# Patient Record
Sex: FEMALE | Race: WHITE | Employment: OTHER | URBAN - METROPOLITAN AREA
[De-identification: names, ages, dates, MRNs, and addresses within clinical notes are randomized per-mention and may not be internally consistent; named-entity substitution may affect disease eponyms.]

---

## 2021-08-24 ENCOUNTER — TRANSCRIBE ORDER (OUTPATIENT)
Dept: SCHEDULING | Age: 71
End: 2021-08-24

## 2021-08-24 DIAGNOSIS — R10.31 RIGHT LOWER QUADRANT PAIN: Primary | ICD-10-CM

## 2021-08-24 DIAGNOSIS — Z87.19 HISTORY OF DIVERTICULITIS: ICD-10-CM

## 2021-08-24 DIAGNOSIS — Z87.42 HISTORY OF OVARIAN CYST: ICD-10-CM

## 2021-08-31 ENCOUNTER — HOSPITAL ENCOUNTER (OUTPATIENT)
Dept: CT IMAGING | Age: 71
Discharge: HOME | End: 2021-08-31
Attending: NURSE PRACTITIONER
Payer: MEDICARE

## 2021-08-31 DIAGNOSIS — Z87.42 HISTORY OF OVARIAN CYST: ICD-10-CM

## 2021-08-31 DIAGNOSIS — R10.31 RIGHT LOWER QUADRANT PAIN: ICD-10-CM

## 2021-08-31 DIAGNOSIS — Z87.19 HISTORY OF DIVERTICULITIS: ICD-10-CM

## 2021-08-31 LAB — CREAT BLD-MCNC: 0.91 MG/DL (ref 0.8–1.5)

## 2021-08-31 PROCEDURE — 82565 ASSAY OF CREATININE: CPT

## 2021-08-31 PROCEDURE — 74011000636 HC RX REV CODE- 636

## 2021-08-31 PROCEDURE — 74177 CT ABD & PELVIS W/CONTRAST: CPT

## 2021-08-31 PROCEDURE — 74011000258 HC RX REV CODE- 258

## 2021-08-31 RX ORDER — SODIUM CHLORIDE 0.9 % (FLUSH) 0.9 %
10 SYRINGE (ML) INJECTION
Status: COMPLETED | OUTPATIENT
Start: 2021-08-31 | End: 2021-08-31

## 2021-08-31 RX ADMIN — Medication 10 ML: at 11:34

## 2021-08-31 RX ADMIN — DIATRIZOATE MEGLUMINE AND DIATRIZOATE SODIUM 15 ML: 660; 100 LIQUID ORAL; RECTAL at 11:34

## 2021-08-31 RX ADMIN — SODIUM CHLORIDE 100 ML: 900 INJECTION, SOLUTION INTRAVENOUS at 11:34

## 2021-08-31 RX ADMIN — IOPAMIDOL 100 ML: 755 INJECTION, SOLUTION INTRAVENOUS at 11:33

## 2022-01-12 ENCOUNTER — APPOINTMENT (OUTPATIENT)
Dept: GENERAL RADIOLOGY | Age: 72
End: 2022-01-12
Attending: STUDENT IN AN ORGANIZED HEALTH CARE EDUCATION/TRAINING PROGRAM
Payer: MEDICARE

## 2022-01-12 ENCOUNTER — HOSPITAL ENCOUNTER (EMERGENCY)
Age: 72
Discharge: HOME | End: 2022-01-12
Attending: EMERGENCY MEDICINE
Payer: MEDICARE

## 2022-01-12 VITALS
RESPIRATION RATE: 21 BRPM | OXYGEN SATURATION: 95 % | DIASTOLIC BLOOD PRESSURE: 78 MMHG | HEART RATE: 66 BPM | TEMPERATURE: 97.9 F | SYSTOLIC BLOOD PRESSURE: 124 MMHG

## 2022-01-12 DIAGNOSIS — U07.1 COVID-19: Primary | ICD-10-CM

## 2022-01-12 LAB
ALBUMIN SERPL-MCNC: 3.6 G/DL (ref 3.2–4.6)
ALBUMIN/GLOB SERPL: 0.8 {RATIO} (ref 1.2–3.5)
ALP SERPL-CCNC: 109 U/L (ref 50–136)
ALT SERPL-CCNC: 36 U/L (ref 12–65)
ANION GAP SERPL CALC-SCNC: 10 MMOL/L (ref 7–16)
AST SERPL-CCNC: 27 U/L (ref 15–37)
ATRIAL RATE: 84 BPM
BASOPHILS # BLD: 0.2 K/UL (ref 0–0.2)
BASOPHILS NFR BLD: 3 % (ref 0–2)
BILIRUB SERPL-MCNC: 0.6 MG/DL (ref 0.2–1.1)
BUN SERPL-MCNC: 10 MG/DL (ref 8–23)
CALCIUM SERPL-MCNC: 9.3 MG/DL (ref 8.3–10.4)
CALCULATED P AXIS, ECG09: 58 DEGREES
CALCULATED R AXIS, ECG10: 82 DEGREES
CALCULATED T AXIS, ECG11: 30 DEGREES
CHLORIDE SERPL-SCNC: 107 MMOL/L (ref 98–107)
CO2 SERPL-SCNC: 22 MMOL/L (ref 21–32)
COVID-19 RAPID TEST, COVR: DETECTED
CREAT SERPL-MCNC: 1 MG/DL (ref 0.6–1)
CRP SERPL-MCNC: <0.3 MG/DL (ref 0–0.9)
D DIMER PPP FEU-MCNC: 0.98 UG/ML(FEU)
DIAGNOSIS, 93000: NORMAL
DIFFERENTIAL METHOD BLD: ABNORMAL
EOSINOPHIL # BLD: 0.1 K/UL (ref 0–0.8)
EOSINOPHIL NFR BLD: 1 % (ref 0.5–7.8)
ERYTHROCYTE [DISTWIDTH] IN BLOOD BY AUTOMATED COUNT: 12.3 % (ref 11.9–14.6)
GLOBULIN SER CALC-MCNC: 4.3 G/DL (ref 2.3–3.5)
GLUCOSE SERPL-MCNC: 90 MG/DL (ref 65–100)
HCT VFR BLD AUTO: 44.8 % (ref 35.8–46.3)
HGB BLD-MCNC: 14.9 G/DL (ref 11.7–15.4)
IMM GRANULOCYTES # BLD AUTO: 0 K/UL (ref 0–0.5)
IMM GRANULOCYTES NFR BLD AUTO: 0 % (ref 0–5)
LIPASE SERPL-CCNC: 137 U/L (ref 73–393)
LYMPHOCYTES # BLD: 3.7 K/UL (ref 0.5–4.6)
LYMPHOCYTES NFR BLD: 63 % (ref 13–44)
MAGNESIUM SERPL-MCNC: 1.7 MG/DL (ref 1.8–2.4)
MCH RBC QN AUTO: 28.7 PG (ref 26.1–32.9)
MCHC RBC AUTO-ENTMCNC: 33.3 G/DL (ref 31.4–35)
MCV RBC AUTO: 86.3 FL (ref 79.6–97.8)
MONOCYTES # BLD: 0.5 K/UL (ref 0.1–1.3)
MONOCYTES NFR BLD: 8 % (ref 4–12)
NEUTS SEG # BLD: 1.5 K/UL (ref 1.7–8.2)
NEUTS SEG NFR BLD: 25 % (ref 43–78)
NRBC # BLD: 0 K/UL (ref 0–0.2)
P-R INTERVAL, ECG05: 176 MS
PLATELET # BLD AUTO: 263 K/UL (ref 150–450)
PMV BLD AUTO: 10 FL (ref 9.4–12.3)
POTASSIUM SERPL-SCNC: 3.6 MMOL/L (ref 3.5–5.1)
PROT SERPL-MCNC: 7.9 G/DL (ref 6.3–8.2)
Q-T INTERVAL, ECG07: 392 MS
QRS DURATION, ECG06: 72 MS
QTC CALCULATION (BEZET), ECG08: 463 MS
RBC # BLD AUTO: 5.19 M/UL (ref 4.05–5.2)
SODIUM SERPL-SCNC: 139 MMOL/L (ref 136–145)
SOURCE, COVRS: ABNORMAL
TROPONIN-HIGH SENSITIVITY: 11.8 PG/ML (ref 0–14)
TROPONIN-HIGH SENSITIVITY: 8.1 PG/ML (ref 0–14)
VENTRICULAR RATE, ECG03: 84 BPM
WBC # BLD AUTO: 5.9 K/UL (ref 4.3–11.1)

## 2022-01-12 PROCEDURE — 86140 C-REACTIVE PROTEIN: CPT

## 2022-01-12 PROCEDURE — 83735 ASSAY OF MAGNESIUM: CPT

## 2022-01-12 PROCEDURE — 85379 FIBRIN DEGRADATION QUANT: CPT

## 2022-01-12 PROCEDURE — 93005 ELECTROCARDIOGRAM TRACING: CPT | Performed by: STUDENT IN AN ORGANIZED HEALTH CARE EDUCATION/TRAINING PROGRAM

## 2022-01-12 PROCEDURE — 80053 COMPREHEN METABOLIC PANEL: CPT

## 2022-01-12 PROCEDURE — 85025 COMPLETE CBC W/AUTO DIFF WBC: CPT

## 2022-01-12 PROCEDURE — 94762 N-INVAS EAR/PLS OXIMTRY CONT: CPT

## 2022-01-12 PROCEDURE — 71046 X-RAY EXAM CHEST 2 VIEWS: CPT

## 2022-01-12 PROCEDURE — 87635 SARS-COV-2 COVID-19 AMP PRB: CPT

## 2022-01-12 PROCEDURE — 83690 ASSAY OF LIPASE: CPT

## 2022-01-12 PROCEDURE — 93005 ELECTROCARDIOGRAM TRACING: CPT | Performed by: EMERGENCY MEDICINE

## 2022-01-12 PROCEDURE — 99284 EMERGENCY DEPT VISIT MOD MDM: CPT

## 2022-01-12 PROCEDURE — 84484 ASSAY OF TROPONIN QUANT: CPT

## 2022-01-12 RX ORDER — SODIUM CHLORIDE 0.9 % (FLUSH) 0.9 %
5-10 SYRINGE (ML) INJECTION AS NEEDED
Status: DISCONTINUED | OUTPATIENT
Start: 2022-01-12 | End: 2022-01-12 | Stop reason: HOSPADM

## 2022-01-12 RX ORDER — SODIUM CHLORIDE 0.9 % (FLUSH) 0.9 %
5-10 SYRINGE (ML) INJECTION EVERY 8 HOURS
Status: DISCONTINUED | OUTPATIENT
Start: 2022-01-12 | End: 2022-01-12 | Stop reason: HOSPADM

## 2022-01-12 NOTE — ED TRIAGE NOTES
Patient arrives via [place] from home. Masked. Reports shortness of breath, dry cough, worsening over past week. 100 RA; 156/70; HR 75;  Reports chest pain this am.  Denies leg swelling. Denies fever, chills, body aches. No interventions with EMS.

## 2022-01-12 NOTE — DISCHARGE INSTRUCTIONS
USE THE FOLLOWING TREATMENTS AND SUPPLEMENTS TO HELP SPEED UP YOUR RECOVERY:  (ALL AVAILABLE WITHOUT PRESCRIPTION)    VITAMIN D3 5000UNITS DAILY  VITAMIN C  500-1000MG TWICE DAILY  QUERCETIN 250MG DAILY  ZINC  100MG DAILY (elemental Zinc)  ASPIRIN 325MG DAILY (UNLESS ALLERGIC OR PREVIOUS ADVERSE REACTION)  MELATONIN 10MG AT BEDTIME      ANTI-SEPTIC/ANTI-VIRALS    ANTIVIRAL MOUTHWASH: GARGLE 3 TIMES DAILY (DO NOT SWALLOW; MUST CONTAIN CHLORHEXIDINE, POVIDONE-IODINE, OR CETYLPURIDINIUM CHLORIDE)  IODINE NASAL SPRAY/DROPS:  USE 1% POVIDONE-IODINE SPRAY AS INSTRUCTED 2-3 TIMES DAILY    MONITOR BLOOD OXYGEN SATURATION WITH PULSE OXIMETER  (AVAILABLE AT MOST DRUG STORES)    VISIT: [email or web address]  for more information and telemedicine links to providers who may be able to prescribe additional antiviral medications.

## 2022-01-12 NOTE — ED PROVIDER NOTES
Patient presents emergency room with complaints of generalized weakness and fatigue that have been present for about a week now. Symptoms have been gradually worsening and are associate with a dry cough and change in taste. She has had some chest discomfort this morning as well. She denies any fever or chills or body aches and review of systems is otherwise negative. Patient reports history of hypertension. The history is provided by the patient and medical records. Fatigue  This is a new problem. Episode onset: About a week ago. The problem has been gradually worsening. There was no focality noted. Pertinent negatives include no focal weakness, no loss of sensation, no loss of balance, no slurred speech, no speech difficulty, no memory loss, no movement disorder, no agitation, no visual change, no auditory change, no mental status change, no unresponsiveness and no disorientation. There has been no fever. Associated symptoms include shortness of breath and chest pain. Pertinent negatives include no vomiting, no altered mental status, no confusion, no headaches, no choking, no nausea, no bowel incontinence and no bladder incontinence. There were no medications administered prior to arrival.        No past medical history on file. No past surgical history on file. No family history on file.     Social History     Socioeconomic History    Marital status:      Spouse name: Not on file    Number of children: Not on file    Years of education: Not on file    Highest education level: Not on file   Occupational History    Not on file   Tobacco Use    Smoking status: Not on file    Smokeless tobacco: Not on file   Substance and Sexual Activity    Alcohol use: Not on file    Drug use: Not on file    Sexual activity: Not on file   Other Topics Concern    Not on file   Social History Narrative    Not on file     Social Determinants of Health     Financial Resource Strain:     Difficulty of Paying Living Expenses: Not on file   Food Insecurity:     Worried About Running Out of Food in the Last Year: Not on file    Ran Out of Food in the Last Year: Not on file   Transportation Needs:     Lack of Transportation (Medical): Not on file    Lack of Transportation (Non-Medical): Not on file   Physical Activity:     Days of Exercise per Week: Not on file    Minutes of Exercise per Session: Not on file   Stress:     Feeling of Stress : Not on file   Social Connections:     Frequency of Communication with Friends and Family: Not on file    Frequency of Social Gatherings with Friends and Family: Not on file    Attends Adventist Services: Not on file    Active Member of [address] [place] [address] [employer] or Organizations: Not on file    Attends Club or Organization Meetings: Not on file    Marital Status: Not on file   Intimate Partner Violence:     Fear of Current or Ex-Partner: Not on file    Emotionally Abused: Not on file    Physically Abused: Not on file    Sexually Abused: Not on file   Housing Stability:     Unable to Pay for Housing in the Last Year: Not on file    Number of [place] in the Last Year: Not on file    Unstable Housing in the Last Year: Not on file         ALLERGIES: Patient has no known allergies. Review of Systems   Constitutional: Positive for fatigue. Negative for appetite change, chills and fever. Respiratory: Positive for cough and shortness of breath. Negative for choking. Cardiovascular: Positive for chest pain. Negative for palpitations and leg swelling. Gastrointestinal: Negative for bowel incontinence, nausea and vomiting. Genitourinary: Negative for bladder incontinence. Neurological: Negative for focal weakness, speech difficulty, headaches and loss of balance. Psychiatric/Behavioral: Negative for agitation, confusion and memory loss. All other systems reviewed and are negative.       Vitals:    01/12/22 1027   BP: 139/74   Pulse: 79   Resp: 20   Temp: 97.9 °F (36.6 °C)   SpO2: 100%            Physical Exam  Vitals and nursing note reviewed. Constitutional:       General: She is not in acute distress. Appearance: Normal appearance. She is not ill-appearing, toxic-appearing or diaphoretic. HENT:      Head: Normocephalic and atraumatic. Nose: Nose normal.      Mouth/Throat:      Mouth: Mucous membranes are moist.      Pharynx: Oropharynx is clear. Eyes:      Extraocular Movements: Extraocular movements intact. Conjunctiva/sclera: Conjunctivae normal.      Pupils: Pupils are equal, round, and reactive to light. Cardiovascular:      Rate and Rhythm: Normal rate and regular rhythm. Pulses: Normal pulses. Heart sounds: Normal heart sounds. Pulmonary:      Effort: Pulmonary effort is normal.      Breath sounds: Normal breath sounds. Abdominal:      General: There is no distension. Palpations: Abdomen is soft. Tenderness: There is no abdominal tenderness. There is no right CVA tenderness, left CVA tenderness, guarding or rebound. Musculoskeletal:         General: Normal range of motion. Cervical back: Normal range of motion and neck supple. Skin:     General: Skin is warm and dry. Capillary Refill: Capillary refill takes less than 2 seconds. Neurological:      General: No focal deficit present. Mental Status: She is alert and oriented to person, place, and time. Mental status is at baseline. Psychiatric:         Mood and Affect: Mood normal.         Behavior: Behavior normal.         Thought Content: Thought content normal.          MDM  Number of Diagnoses or Management Options  COVID-19  Diagnosis management comments: Given duration of symptoms that have been constant with a normal EKG and negative troponin cardiac etiology of the discomfort is ruled out. Patient does have a positive COVID test and her symptoms are consistent with that.   Patient does have risk factors that would qualify her for monoclonal antibody therapy however it is not available at this time at this facility. She was given informed consent in regards to the newly released for emergency use oral antiviral medications.        Amount and/or Complexity of Data Reviewed  Clinical lab tests: ordered and reviewed  Tests in the radiology section of CPT®: ordered and reviewed  Review and summarize past medical records: yes  Independent visualization of images, tracings, or specimens: yes (EKG at 10:32 AM: Is a normal sinus rhythm, rate of 84, no acute ischemic changes and no ectopy.)    Risk of Complications, Morbidity, and/or Mortality  Presenting problems: moderate  Diagnostic procedures: moderate  Management options: moderate    Patient Progress  Patient progress: stable         Procedures

## 2022-01-12 NOTE — ED NOTES
I have reviewed discharge instructions with the patient. The patient verbalized understanding. Patient left ED via Discharge Method: ambulatory to Home with . Opportunity for questions and clarification provided. Patient given 0 scripts. To continue your aftercare when you leave the hospital, you may receive an automated call from our care team to check in on how you are doing. This is a free service and part of our promise to provide the best care and service to meet your aftercare needs.  If you have questions, or wish to unsubscribe from this service please call [phone]. Thank you for Choosing our [facility] Emergency Department.

## 2022-01-13 ENCOUNTER — HOSPITAL ENCOUNTER (EMERGENCY)
Age: 72
Discharge: HOME | End: 2022-01-13
Attending: EMERGENCY MEDICINE
Payer: MEDICARE

## 2022-01-13 VITALS
HEART RATE: 97 BPM | HEIGHT: 66 IN | BODY MASS INDEX: 27.32 KG/M2 | DIASTOLIC BLOOD PRESSURE: 85 MMHG | OXYGEN SATURATION: 96 % | TEMPERATURE: 98.4 F | SYSTOLIC BLOOD PRESSURE: 138 MMHG | WEIGHT: 170 LBS | RESPIRATION RATE: 20 BRPM

## 2022-01-13 DIAGNOSIS — U07.1 COVID-19: Primary | ICD-10-CM

## 2022-01-13 PROCEDURE — 99282 EMERGENCY DEPT VISIT SF MDM: CPT

## 2022-01-13 PROCEDURE — 74011000258 HC RX REV CODE- 258: Performed by: EMERGENCY MEDICINE

## 2022-01-13 PROCEDURE — 74011000636 HC RX REV CODE- 636: Performed by: EMERGENCY MEDICINE

## 2022-01-13 PROCEDURE — M0243 CASIRIVI AND IMDEVI INFUSION: HCPCS

## 2022-01-13 RX ADMIN — CASIRIVIMAB AND IMDEVIMAB 1200 MG: 600; 600 INJECTION, SOLUTION, CONCENTRATE INTRAVENOUS at 14:55

## 2022-01-13 NOTE — ED PROVIDER NOTES
CERTIFICATE OF WORK        September 28, 2021      Re:   Cornelia Ibrahim  [address] [facility] [address]                        This is to certify that Cornelia Ibrahim was seen in the urgent care on 9/28/2021 and can return to regular work on 9/30/202; pending negative COVID and symptom free for 24 hours or more.          SIGNATURE:___________________________________________,   9/28/2021                                      SPENCER Meng        [facility]  [address] [place] [address] [phone]   Patient was seen yesterday and diagnosed with COVID. Feeling worse today with worsening shortness of breath and body aches. She was prescribed the oral Paxlovid yesterday and took her first dose last night. Returns today requesting the IV antibodies. The history is provided by the patient. No  was used. Positive For Covid-19  Severity:  Moderate  Timing:  Constant  Progression:  Worsening  Chronicity:  New  Relieved by:  Nothing  Worsened by:  Nothing  Ineffective treatments:  None tried  Associated symptoms: myalgias    Associated symptoms: no chest pain, no chills, no diarrhea, no headaches, no nausea, no rash and no vomiting         No past medical history on file. No past surgical history on file. No family history on file. Social History     Socioeconomic History    Marital status:      Spouse name: Not on file    Number of children: Not on file    Years of education: Not on file    Highest education level: Not on file   Occupational History    Not on file   Tobacco Use    Smoking status: Not on file    Smokeless tobacco: Not on file   Substance and Sexual Activity    Alcohol use: Not on file    Drug use: Not on file    Sexual activity: Not on file   Other Topics Concern    Not on file   Social History Narrative    Not on file     Social Determinants of Health     Financial Resource Strain:     Difficulty of Paying Living Expenses: Not on file   Food Insecurity:     Worried About Running Out of Food in the Last Year: Not on file    Matt of Food in the Last Year: Not on file   Transportation Needs:     Lack of Transportation (Medical): Not on file    Lack of Transportation (Non-Medical):  Not on file   Physical Activity:     Days of Exercise per Week: Not on file    Minutes of Exercise per Session: Not on file   Stress:     Feeling of Stress : Not on file   Social Connections:     Frequency of Communication with Friends and Family: Not on file    Frequency of Social Gatherings with Friends and Family: Not on file    Attends Zoroastrian Services: Not on file    Active Member of Clubs or Organizations: Not on file    Attends Club or Organization Meetings: Not on file    Marital Status: Not on file   Intimate Partner Violence:     Fear of Current or Ex-Partner: Not on file    Emotionally Abused: Not on file    Physically Abused: Not on file    Sexually Abused: Not on file   Housing Stability:     Unable to Pay for Housing in the Last Year: Not on file    Number of [place] in the Last Year: Not on file    Unstable Housing in the Last Year: Not on file         ALLERGIES: Patient has no known allergies. Review of Systems   Constitutional: Positive for fatigue. Negative for chills and fever. Eyes: Negative for pain and redness. Respiratory: Positive for shortness of breath. Negative for chest tightness and wheezing. Cardiovascular: Negative for chest pain and leg swelling. Gastrointestinal: Negative for abdominal pain, diarrhea, nausea and vomiting. Musculoskeletal: Positive for myalgias. Negative for back pain, gait problem, neck pain and neck stiffness. Skin: Negative for color change and rash. Neurological: Negative for weakness, numbness and headaches. Vitals:    01/13/22 1250   BP: 138/85   Pulse: 97   Resp: 20   Temp: 98.4 °F (36.9 °C)   SpO2: 99%   Weight: 77.1 kg (170 lb)   Height: 5' 5.75\" (1.67 m)            Physical Exam  Constitutional:       Appearance: Normal appearance. She is well-developed. HENT:      Head: Normocephalic and atraumatic. Cardiovascular:      Rate and Rhythm: Normal rate and regular rhythm. Pulmonary:      Effort: Respiratory distress present. Breath sounds: Normal breath sounds. No wheezing. Abdominal:      General: Bowel sounds are normal.      Palpations: Abdomen is soft. Tenderness: There is no abdominal tenderness. Musculoskeletal:         General: Normal range of motion. Cervical back: Normal range of motion and neck supple. Skin:     General: Skin is warm and dry. Neurological:      Mental Status: She is alert and oriented to person, place, and time. MDM  Number of Diagnoses or Management Options  Diagnosis management comments: Had a long discussion about the hospital only having Regeneron at this time. May not be effective for omicron. Also that she does not need to take both Regeneron and Paxlovid. She is requesting the Regeneron and will stop the Paxil COVID. We will then isolate at home after receiving. Will order and discharge.        Amount and/or Complexity of Data Reviewed  Tests in the medicine section of CPT®: ordered and reviewed    Patient Progress  Patient progress: stable         Procedures

## 2022-01-13 NOTE — ED TRIAGE NOTES
Pt ambulatory to triage with report of positive COVID yesterday. Reports she feels worse today.  VSS in triage requesting antibodies

## 2022-01-13 NOTE — ED NOTES
I have reviewed discharge instructions with the patient. The patient verbalized understanding. Patient left ED via Discharge Method: ambulatory to [facility]. Opportunity for questions and clarification provided. Patient given 0 scripts. To continue your aftercare when you leave the hospital, you may receive an automated call from our care team to check in on how you are doing. This is a free service and part of our promise to provide the best care and service to meet your aftercare needs.  If you have questions, or wish to unsubscribe from this service please call [phone]. Thank you for Choosing our [facility] Emergency Department.

## 2022-01-13 NOTE — Clinical Note
[address] [facility] EMERGENCY DEPT   [facility] [address] [place] [id number] [phone]    Work/School Note    Date: 1/13/2022     To Whom It May concern:    Everett Pradhan was evaluated by the following provider(s):  Attending Provider: Madeline Batista [address] virus is suspected. Per the CDC guidelines we recommend home isolation until the following conditions are all met:    1. At least five days have passed since symptoms first appeared and/or had a close exposure,   2. After home isolation for five days, wearing a mask around others for the next five days,  3. At least 24 have passed since last fever without the use of fever-reducing medications and  4.  Symptoms (eg cough, shortness of breath) have improved      Sincerely,          Michelle Hassan RN

## 2022-01-13 NOTE — Clinical Note
[address] [facility] EMERGENCY DEPT   [facility]  [place] [facility] [id number] [phone]    Work/School Note    Date: 1/13/2022     To Whom It May concern:    Cortez Jaramillo was evaluated by the following provider(s):  Attending Provider: Soraya Jorge [address] [place] [address] virus is suspected. Per the CDC guidelines we recommend home isolation until the following conditions are all met:    1. At least five days have passed since symptoms first appeared and/or had a close exposure,   2. After home isolation for five days, wearing a mask around others for the next five days,  3. At least 24 have passed since last fever without the use of fever-reducing medications and  4.  Symptoms (eg cough, shortness of breath) have improved      Sincerely,          [place] Carlos Gamboa MD

## 2022-01-31 PROBLEM — I10 HYPERTENSION: Status: ACTIVE | Noted: 2019-10-05

## 2022-01-31 PROBLEM — K21.9 GERD (GASTROESOPHAGEAL REFLUX DISEASE): Status: ACTIVE | Noted: 2019-10-05

## 2022-01-31 PROBLEM — E87.6 HYPOKALEMIA: Status: ACTIVE | Noted: 2019-10-05

## 2022-01-31 PROBLEM — R00.2 PALPITATION: Status: ACTIVE | Noted: 2019-10-08

## 2022-01-31 PROBLEM — M51.36 DEGENERATION OF INTERVERTEBRAL DISC OF LUMBAR REGION: Status: ACTIVE | Noted: 2018-09-27

## 2022-01-31 PROBLEM — M54.16 LUMBAR RADICULOPATHY: Status: ACTIVE | Noted: 2018-09-27

## 2022-01-31 PROBLEM — E78.00 HYPERCHOLESTEREMIA: Status: ACTIVE | Noted: 2019-10-05

## 2022-03-11 ENCOUNTER — HOSPITAL ENCOUNTER (OUTPATIENT)
Dept: MAMMOGRAPHY | Age: 72
Discharge: HOME | End: 2022-03-11

## 2022-03-11 DIAGNOSIS — Z12.31 BREAST CANCER SCREENING BY MAMMOGRAM: ICD-10-CM

## 2022-03-18 PROBLEM — E66.3 OVERWEIGHT (BMI 25.0-29.9): Status: ACTIVE | Noted: 2022-03-18

## 2022-03-18 PROBLEM — I10 HYPERTENSION: Status: ACTIVE | Noted: 2019-10-05

## 2022-03-18 PROBLEM — R00.2 PALPITATION: Status: ACTIVE | Noted: 2019-10-08

## 2022-03-18 PROBLEM — R07.89 ATYPICAL CHEST PAIN: Status: ACTIVE | Noted: 2022-03-18

## 2022-03-18 PROBLEM — E78.5 HYPERLIPIDEMIA: Status: ACTIVE | Noted: 2019-10-05

## 2022-03-19 PROBLEM — M51.36 DEGENERATION OF INTERVERTEBRAL DISC OF LUMBAR REGION: Status: ACTIVE | Noted: 2018-09-27

## 2022-03-19 PROBLEM — E87.6 HYPOKALEMIA: Status: ACTIVE | Noted: 2019-10-05

## 2022-03-19 PROBLEM — E78.5 HYPERLIPIDEMIA: Status: ACTIVE | Noted: 2019-10-05

## 2022-03-19 PROBLEM — M54.16 LUMBAR RADICULOPATHY: Status: ACTIVE | Noted: 2018-09-27

## 2022-03-19 PROBLEM — M51.369 DEGENERATION OF INTERVERTEBRAL DISC OF LUMBAR REGION: Status: ACTIVE | Noted: 2018-09-27

## 2022-03-20 PROBLEM — K21.9 GERD (GASTROESOPHAGEAL REFLUX DISEASE): Status: ACTIVE | Noted: 2019-10-05

## 2022-03-22 ENCOUNTER — HOSPITAL ENCOUNTER (OUTPATIENT)
Dept: CT IMAGING | Age: 72
Discharge: HOME | End: 2022-03-22
Attending: INTERNAL MEDICINE

## 2022-03-22 DIAGNOSIS — R07.89 ATYPICAL CHEST PAIN: ICD-10-CM

## 2022-03-22 RX ORDER — SODIUM CHLORIDE 0.9 % (FLUSH) 0.9 %
10 SYRINGE (ML) INJECTION
Status: COMPLETED | OUTPATIENT
Start: 2022-03-22 | End: 2022-03-22

## 2022-03-22 RX ADMIN — Medication 10 ML: at 15:14

## 2022-03-22 NOTE — PROGRESS NOTES
Please let the patient know that her CTA was negative for PE. I placed a pulmonary medicine consult for an incidental finding of pulmonary nodules. I placed an ultrasound of the thyroid in the setting of an incidental finding of thyroid nodules.

## 2022-03-24 PROBLEM — E66.3 OVERWEIGHT (BMI 25.0-29.9): Status: ACTIVE | Noted: 2022-03-18

## 2022-03-24 PROBLEM — R00.2 PALPITATIONS: Status: ACTIVE | Noted: 2019-10-08

## 2022-03-24 PROBLEM — R07.89 ATYPICAL CHEST PAIN: Status: ACTIVE | Noted: 2022-03-18

## 2022-04-05 ENCOUNTER — HOSPITAL ENCOUNTER (OUTPATIENT)
Dept: ULTRASOUND IMAGING | Age: 72
Discharge: HOME | End: 2022-04-05
Attending: INTERNAL MEDICINE
Payer: MEDICARE

## 2022-04-05 DIAGNOSIS — E04.1 THYROID NODULE INCIDENTALLY NOTED ON IMAGING STUDY: ICD-10-CM

## 2022-04-05 PROCEDURE — 76536 US EXAM OF HEAD AND NECK: CPT

## 2022-04-05 NOTE — PROGRESS NOTES
Please let the patient know that she has thyroid nodules that the radiologist recommends FNA. I placed a endocrinology consult.

## 2022-04-29 PROBLEM — R05.9 COUGH: Status: ACTIVE | Noted: 2022-04-29

## 2022-04-29 PROBLEM — R91.8 PULMONARY NODULES: Status: ACTIVE | Noted: 2022-04-29

## 2022-05-05 PROBLEM — E04.2 MULTINODULAR GOITER: Status: ACTIVE | Noted: 2022-05-05

## 2022-05-05 PROBLEM — R59.0 CERVICAL LYMPHADENOPATHY: Status: ACTIVE | Noted: 2022-05-05

## 2022-05-24 ENCOUNTER — PATIENT MESSAGE (OUTPATIENT)
Dept: ENDOCRINOLOGY | Age: 72
End: 2022-05-24

## 2022-05-24 DIAGNOSIS — R59.0 CERVICAL LYMPHADENOPATHY: ICD-10-CM

## 2022-05-24 DIAGNOSIS — E04.2 MULTINODULAR GOITER: Primary | ICD-10-CM

## 2022-05-25 NOTE — TELEPHONE ENCOUNTER
From: Dr. Garnett [place]  To: Nelson Sorenson  Sent: 5/24/2022 2:27 PM EDT  Subject: Final thyroid biopsy results    I received the final biopsy results. As you may recall, the nodule in the left lobe was indeterminate (cytology revealed atypia of undetermined significance). The molecular testing came back and was read as \"suspicious. \" This confers an approximately 50% risk of thyroid cancer. For this reason, I would recommend that you have a total thyroidectomy. Please let me know if you have any questions. If this is acceptable, let me know and I will refer you to the surgeon. Thanks.     Dr. Reema Hoang

## 2022-05-29 PROBLEM — R05.9 COUGH: Status: RESOLVED | Noted: 2022-04-29 | Resolved: 2022-05-29

## 2022-06-20 ENCOUNTER — TELEPHONE (OUTPATIENT)
Dept: FAMILY MEDICINE CLINIC | Facility: CLINIC | Age: 72
End: 2022-06-20

## 2022-06-20 DIAGNOSIS — E89.0 POSTOPERATIVE HYPOTHYROIDISM: Primary | ICD-10-CM

## 2022-06-20 RX ORDER — LEVOTHYROXINE SODIUM 125 UG/1
125 TABLET ORAL DAILY
Qty: 90 TABLET | Refills: 3 | Status: SHIPPED | OUTPATIENT
Start: 2022-06-20 | End: 2022-07-07 | Stop reason: DRUGHIGH

## 2022-06-20 RX ORDER — LEVOTHYROXINE SODIUM 125 UG/1
TABLET ORAL
COMMUNITY
Start: 2022-06-11 | End: 2022-06-20 | Stop reason: SDUPTHER

## 2022-07-06 ENCOUNTER — PATIENT MESSAGE (OUTPATIENT)
Dept: ENDOCRINOLOGY | Age: 72
End: 2022-07-06

## 2022-07-07 RX ORDER — LEVOTHYROXINE SODIUM 100 UG/1
100 TABLET ORAL DAILY
Qty: 30 TABLET | Refills: 5 | Status: SHIPPED | OUTPATIENT
Start: 2022-07-07 | End: 2022-08-19 | Stop reason: SDUPTHER

## 2022-07-07 NOTE — TELEPHONE ENCOUNTER
From: Martin Short  To: Dr. Magda Newell: 7/6/2022 3:56 PM EDT  Subject: TSN, T4    [facility] Doctor,  On July 2, 2022, I was at [facility] and today I received the results of a blood test:   TSH = 0.015 and   T4, free(direct) = 2.67. What do you recommend me? Thank you,  Martin Short.

## 2022-08-19 ENCOUNTER — OFFICE VISIT (OUTPATIENT)
Dept: ENDOCRINOLOGY | Age: 72
End: 2022-08-19
Payer: MEDICARE

## 2022-08-19 VITALS
OXYGEN SATURATION: 96 % | SYSTOLIC BLOOD PRESSURE: 118 MMHG | WEIGHT: 175 LBS | DIASTOLIC BLOOD PRESSURE: 70 MMHG | HEART RATE: 84 BPM | HEIGHT: 66 IN | BODY MASS INDEX: 28.12 KG/M2

## 2022-08-19 DIAGNOSIS — E89.0 POSTSURGICAL HYPOTHYROIDISM: ICD-10-CM

## 2022-08-19 DIAGNOSIS — C73 THYROID CANCER (HCC): ICD-10-CM

## 2022-08-19 PROBLEM — E04.2 MULTINODULAR GOITER: Status: RESOLVED | Noted: 2022-05-05 | Resolved: 2022-08-19

## 2022-08-19 PROCEDURE — 1090F PRES/ABSN URINE INCON ASSESS: CPT | Performed by: INTERNAL MEDICINE

## 2022-08-19 PROCEDURE — G8400 PT W/DXA NO RESULTS DOC: HCPCS | Performed by: INTERNAL MEDICINE

## 2022-08-19 PROCEDURE — 1036F TOBACCO NON-USER: CPT | Performed by: INTERNAL MEDICINE

## 2022-08-19 PROCEDURE — 99214 OFFICE O/P EST MOD 30 MIN: CPT | Performed by: INTERNAL MEDICINE

## 2022-08-19 PROCEDURE — G8419 CALC BMI OUT NRM PARAM NOF/U: HCPCS | Performed by: INTERNAL MEDICINE

## 2022-08-19 PROCEDURE — G8427 DOCREV CUR MEDS BY ELIG CLIN: HCPCS | Performed by: INTERNAL MEDICINE

## 2022-08-19 PROCEDURE — 1123F ACP DISCUSS/DSCN MKR DOCD: CPT | Performed by: INTERNAL MEDICINE

## 2022-08-19 PROCEDURE — 3017F COLORECTAL CA SCREEN DOC REV: CPT | Performed by: INTERNAL MEDICINE

## 2022-08-19 RX ORDER — LEVOTHYROXINE SODIUM 100 UG/1
100 TABLET ORAL DAILY
Qty: 30 TABLET | Refills: 5
Start: 2022-08-19 | End: 2022-08-22 | Stop reason: DRUGHIGH

## 2022-08-19 ASSESSMENT — ENCOUNTER SYMPTOMS
ROS SKIN COMMENTS: DENIES HAIR LOSS, DRY SKIN.
DIARRHEA: 0
CONSTIPATION: 0

## 2022-08-19 NOTE — PROGRESS NOTES
Rod Jacinto MD, [facility]  [address], [address] [facility], [address] [place] [address]  Phone [phone]  Facsimile [phone]          Adriana Harris is a 71 y.o. female seen 8/19/2022 for follow-up of thyroid cancer and. Hypothyroidism        ASSESSMENT AND PLAN:    1. 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 There were no high risk features on final pathology and surgery should be adequate therapy. I have therefore recommended against radioactive iodine remnant ablation. We are likely to detect any clinically significant disease persistence or recurrence through routine monitoring of thyroglobulin levels and neck ultrasounds. I will check a baseline postoperative thyroglobulin level today. 2. Postsurgical hypothyroidism  Goal TSH 0.4-2.0 for now. I will check her thyroid function tests and let her know if her dose needs to be adjusted. - EUTHYROX 100 MCG tablet; Take 1 tablet by mouth Daily  Dispense: 30 tablet; Refill: 5      Follow-up and Dispositions    Return in about 3 months (around 11/19/2022), or Friday afternoon is okay. HISTORY OF PRESENT ILLNESS:    THYROID CANCER    Presentation: Multinodular goiter with Afirma suspicious left lobe nodule status post total thyroidectomy and central lymphadenectomy 6/10/2022 (pathology revealed 1. 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 No current facility-administered medications for this visit.

## 2022-08-20 LAB
T4 FREE SERPL-MCNC: 1.4 NG/DL (ref 0.78–1.46)
TSH, 3RD GENERATION: 0.02 UIU/ML (ref 0.36–3.74)

## 2022-08-22 ENCOUNTER — TELEPHONE (OUTPATIENT)
Dept: ENDOCRINOLOGY | Age: 72
End: 2022-08-22

## 2022-08-22 DIAGNOSIS — E89.0 POSTSURGICAL HYPOTHYROIDISM: Primary | ICD-10-CM

## 2022-08-22 LAB — THYROGLOB AB SERPL-ACNC: 630.4 IU/ML (ref 0–0.9)

## 2022-08-22 RX ORDER — LEVOTHYROXINE SODIUM 75 UG/1
75 TABLET ORAL DAILY
Qty: 30 TABLET | Refills: 5 | Status: SHIPPED | OUTPATIENT
Start: 2022-08-22

## 2022-08-22 NOTE — TELEPHONE ENCOUNTER
Patient informed of new dose of Euthyrox and that it was sent to her pharmacy. Patient expressed understanding.

## 2022-08-28 LAB — THYROGLOBULIN: 7.9 NG/ML

## 2022-08-29 ENCOUNTER — PATIENT MESSAGE (OUTPATIENT)
Dept: ENDOCRINOLOGY | Age: 72
End: 2022-08-29

## 2022-08-29 DIAGNOSIS — C73 THYROID CANCER (HCC): Primary | ICD-10-CM

## 2022-09-15 ENCOUNTER — HOSPITAL ENCOUNTER (OUTPATIENT)
Dept: CT IMAGING | Age: 72
Discharge: HOME | End: 2022-09-18
Payer: MEDICARE

## 2022-09-15 ENCOUNTER — HOSPITAL ENCOUNTER (OUTPATIENT)
Dept: ULTRASOUND IMAGING | Age: 72
Discharge: HOME | End: 2022-09-18
Payer: MEDICARE

## 2022-09-15 VITALS — BODY MASS INDEX: 29.88 KG/M2 | WEIGHT: 175 LBS | HEIGHT: 64 IN

## 2022-09-15 DIAGNOSIS — C73 THYROID CANCER (HCC): ICD-10-CM

## 2022-09-15 PROCEDURE — 71250 CT THORAX DX C-: CPT

## 2022-09-15 PROCEDURE — 76536 US EXAM OF HEAD AND NECK: CPT

## 2022-09-20 ENCOUNTER — TELEPHONE (OUTPATIENT)
Dept: ENDOCRINOLOGY | Age: 72
End: 2022-09-20

## 2022-09-20 DIAGNOSIS — C73 THYROID CANCER (HCC): Primary | ICD-10-CM

## 2022-09-20 NOTE — TELEPHONE ENCOUNTER
----- Message from Bessie Acosta sent at 9/20/2022  9:24 AM EDT -----  Patient called stating she does want to get the biopsy done. Did you want it in a follow up or biopsy spot?

## 2022-09-21 NOTE — TELEPHONE ENCOUNTER
Called [place] speaking patient to notify her of the referral put in for her biopsy. Pt expressed understanding.

## 2022-09-29 ENCOUNTER — HOSPITAL ENCOUNTER (OUTPATIENT)
Dept: ULTRASOUND IMAGING | Age: 72
Discharge: HOME | End: 2022-10-02
Payer: MEDICARE

## 2022-09-29 VITALS
HEART RATE: 91 BPM | OXYGEN SATURATION: 93 % | TEMPERATURE: 97.7 F | DIASTOLIC BLOOD PRESSURE: 73 MMHG | SYSTOLIC BLOOD PRESSURE: 137 MMHG | RESPIRATION RATE: 16 BRPM

## 2022-09-29 DIAGNOSIS — C73 THYROID CANCER (HCC): ICD-10-CM

## 2022-09-29 PROCEDURE — 88173 CYTOPATH EVAL FNA REPORT: CPT

## 2022-09-29 PROCEDURE — 2500000003 HC RX 250 WO HCPCS: Performed by: PHYSICIAN ASSISTANT

## 2022-09-29 PROCEDURE — 84432 ASSAY OF THYROGLOBULIN: CPT

## 2022-09-29 PROCEDURE — 38505 NEEDLE BIOPSY LYMPH NODES: CPT

## 2022-09-29 RX ORDER — LIDOCAINE HYDROCHLORIDE 10 MG/ML
INJECTION, SOLUTION INFILTRATION; PERINEURAL
Status: COMPLETED | OUTPATIENT
Start: 2022-09-29 | End: 2022-09-29

## 2022-09-29 RX ADMIN — Medication 5 ML: at 11:01

## 2022-09-29 NOTE — DISCHARGE INSTRUCTIONS
If you have any questions about your procedure, please call the Interventional Radiology department at [phone]. After business hours (5pm) and weekends, call the answering service at [phone] and ask for the Radiologist on call to be paged. Si tiene Preguntas acerca del procedimiento, por favor llame al departamento de Radiología Intervencional al [phone]. Después de horas de oficina (5 pm) y los fines de [place], llamar al Nasreen [place] al [phone] y pregunte por el Rad[facility]ologo de [facility] [facility]. Interventional Radiology General Nurse Discharge    After general anesthesia or intravenous sedation, for 24 hours or while taking prescription Narcotics:  Limit your activities  Do not drive and operate hazardous machinery  Do not make important personal or business decisions  Do  not drink alcoholic beverages  If you have not urinated within 8 hours after discharge, please contact your surgeon on call. * Please give a list of your current medications to your Primary Care Provider. * Please update this list whenever your medications are discontinued, doses are     changed, or new medications (including over-the-counter products) are added. * Please carry medication information at all times in case of emergency situations. These are general instructions for a healthy lifestyle:    No smoking/ No tobacco products/ Avoid exposure to second hand smoke  Surgeon General's Warning:  Quitting smoking now greatly reduces serious risk to your health.     Obesity, smoking, and sedentary lifestyle greatly increases your risk for illness  A healthy diet, regular physical exercise & weight monitoring are important for maintaining a healthy lifestyle    You may be retaining fluid if you have a history of heart failure or if you experience any of the following symptoms:  Weight gain of 3 pounds or more overnight or 5 pounds in a week, increased swelling in our hands or feet or shortness of breath while lying flat in bed. Please call your doctor as soon as you notice any of these symptoms; do not wait until your next office visit. Recognize signs and symptoms of STROKE:  F-face looks uneven    A-arms unable to move or move unevenly    S-speech slurred or non-existent    T-time-call 911 as soon as signs and symptoms begin-DO NOT go       Back to bed or wait to see if you get better-TIME IS BRAIN.

## 2022-09-30 LAB
CYTOLOGY-NON GYN: NORMAL
SPECIMEN SOURCE: NORMAL

## 2022-10-04 ENCOUNTER — TELEPHONE (OUTPATIENT)
Dept: ENDOCRINOLOGY | Age: 72
End: 2022-10-04

## 2022-10-04 DIAGNOSIS — C73 THYROID CANCER (HCC): Primary | ICD-10-CM

## 2022-10-04 RX ORDER — THYROTROPIN ALFA 0.9 MG/ML
INJECTION, POWDER, FOR SOLUTION INTRAMUSCULAR
Qty: 2 EACH | Refills: 0 | Status: SHIPPED | OUTPATIENT
Start: 2022-10-04 | End: 2022-11-15 | Stop reason: ALTCHOICE

## 2022-10-04 NOTE — TELEPHONE ENCOUNTER
Regarding: FW: Test results. ----- Message -----  From: Bety Abraham  Sent: 10/3/2022   6:10 PM EDT  To: Rosy Molina Endocrinology Clinical Staff  Subject: Test results. Of course, thank you, Doctor! Let's do it. Please let me know how to proceed. Thanks! Connie Berry

## 2022-10-05 NOTE — TELEPHONE ENCOUNTER
Thyrogen faxed to the infusion center. Will check to see when patient wants to start her low iodine diet.

## 2022-10-06 LAB
Lab: NORMAL
Lab: NORMAL
REFERENCE LAB: NORMAL

## 2022-10-13 ENCOUNTER — APPOINTMENT (OUTPATIENT)
Dept: MAMMOGRAPHY | Age: 72
End: 2022-10-13
Payer: MEDICARE

## 2022-10-13 ENCOUNTER — HOSPITAL ENCOUNTER (OUTPATIENT)
Dept: MAMMOGRAPHY | Age: 72
Discharge: HOME | End: 2022-10-16
Payer: MEDICARE

## 2022-10-13 DIAGNOSIS — N63.31 LUMP OF AXILLARY TAIL OF RIGHT BREAST: ICD-10-CM

## 2022-10-13 PROCEDURE — 77065 DX MAMMO INCL CAD UNI: CPT

## 2022-10-13 PROCEDURE — 76642 ULTRASOUND BREAST LIMITED: CPT

## 2022-10-13 NOTE — RESULT ENCOUNTER NOTE
NO SPECIFIC EVIDENCE OF MALIGNANCY.   ANNUAL BILATERAL SCREENING MAMMOGRAPHY WILL  BE DUE IN [facility] 2023,

## 2022-11-02 DIAGNOSIS — C73 THYROID CANCER (HCC): ICD-10-CM

## 2022-11-02 LAB — TSH W FREE THYROID IF ABNORMAL: 3.24 UIU/ML (ref 0.36–3.74)

## 2022-11-04 LAB — THYROGLOB AB SERPL-ACNC: 472.8 IU/ML (ref 0–0.9)

## 2022-11-07 ENCOUNTER — HOSPITAL ENCOUNTER (OUTPATIENT)
Dept: INFUSION THERAPY | Age: 72
Discharge: HOME | End: 2022-11-07
Payer: MEDICARE

## 2022-11-07 VITALS
WEIGHT: 176.6 LBS | RESPIRATION RATE: 18 BRPM | DIASTOLIC BLOOD PRESSURE: 80 MMHG | TEMPERATURE: 98.4 F | BODY MASS INDEX: 30.31 KG/M2 | SYSTOLIC BLOOD PRESSURE: 152 MMHG | HEART RATE: 102 BPM

## 2022-11-07 PROCEDURE — 96372 THER/PROPH/DIAG INJ SC/IM: CPT

## 2022-11-07 PROCEDURE — 2580000003 HC RX 258: Performed by: INTERNAL MEDICINE

## 2022-11-07 PROCEDURE — 6360000002 HC RX W HCPCS: Performed by: INTERNAL MEDICINE

## 2022-11-07 RX ADMIN — THYROTROPIN ALFA 0.9 MG: 0.9 INJECTION, POWDER, FOR SOLUTION INTRAMUSCULAR at 08:12

## 2022-11-08 ENCOUNTER — HOSPITAL ENCOUNTER (OUTPATIENT)
Dept: INFUSION THERAPY | Age: 72
Discharge: HOME | End: 2022-11-08
Payer: MEDICARE

## 2022-11-08 VITALS
BODY MASS INDEX: 30.21 KG/M2 | TEMPERATURE: 97.9 F | RESPIRATION RATE: 16 BRPM | HEART RATE: 78 BPM | SYSTOLIC BLOOD PRESSURE: 122 MMHG | WEIGHT: 176 LBS | DIASTOLIC BLOOD PRESSURE: 74 MMHG

## 2022-11-08 PROCEDURE — 6360000002 HC RX W HCPCS: Performed by: INTERNAL MEDICINE

## 2022-11-08 PROCEDURE — 96372 THER/PROPH/DIAG INJ SC/IM: CPT

## 2022-11-08 PROCEDURE — 2580000003 HC RX 258: Performed by: INTERNAL MEDICINE

## 2022-11-08 RX ADMIN — THYROTROPIN ALFA 0.9 MG: 0.9 INJECTION, POWDER, FOR SOLUTION INTRAMUSCULAR at 08:10

## 2022-11-08 NOTE — PROGRESS NOTES
Arrived to the [facility]. Thyrogen injection completed. Provided education on Thyrogen. Patient has had this medication before. Opportunity for questions provided. Patient instructed to report any side affects to ordering provider. Patient tolerated well. Any issues or concerns during appointment: no.  Patient has no future infusion appointments at this time. Discharged ambulatory with self.

## 2022-11-09 ENCOUNTER — HOSPITAL ENCOUNTER (OUTPATIENT)
Dept: NUCLEAR MEDICINE | Age: 72
Discharge: HOME | End: 2022-11-12
Payer: MEDICARE

## 2022-11-09 DIAGNOSIS — C73 THYROID CANCER (HCC): ICD-10-CM

## 2022-11-09 PROCEDURE — 3430000000 HC RX DIAGNOSTIC RADIOPHARMACEUTICAL: Performed by: INTERNAL MEDICINE

## 2022-11-09 PROCEDURE — A9517 I131 IODIDE CAP, RX: HCPCS | Performed by: INTERNAL MEDICINE

## 2022-11-09 PROCEDURE — 79005 NUCLEAR RX ORAL ADMIN: CPT

## 2022-11-09 RX ADMIN — SODIUM IODIDE I 131 103 MILLICURIE: 1 CAPSULE, GELATIN COATED ORAL at 12:19

## 2022-11-11 LAB — THYROGLOBULIN: 5.4 NG/ML

## 2022-11-15 ENCOUNTER — OFFICE VISIT (OUTPATIENT)
Dept: ENDOCRINOLOGY | Age: 72
End: 2022-11-15
Payer: MEDICARE

## 2022-11-15 VITALS
HEART RATE: 72 BPM | RESPIRATION RATE: 18 BRPM | WEIGHT: 181 LBS | HEIGHT: 64 IN | SYSTOLIC BLOOD PRESSURE: 149 MMHG | BODY MASS INDEX: 30.9 KG/M2 | DIASTOLIC BLOOD PRESSURE: 76 MMHG

## 2022-11-15 DIAGNOSIS — E89.0 POSTSURGICAL HYPOTHYROIDISM: ICD-10-CM

## 2022-11-15 DIAGNOSIS — R91.8 PULMONARY NODULES: ICD-10-CM

## 2022-11-15 DIAGNOSIS — C73 THYROID CANCER (HCC): Primary | ICD-10-CM

## 2022-11-15 PROCEDURE — G8400 PT W/DXA NO RESULTS DOC: HCPCS | Performed by: INTERNAL MEDICINE

## 2022-11-15 PROCEDURE — G8417 CALC BMI ABV UP PARAM F/U: HCPCS | Performed by: INTERNAL MEDICINE

## 2022-11-15 PROCEDURE — 99214 OFFICE O/P EST MOD 30 MIN: CPT | Performed by: INTERNAL MEDICINE

## 2022-11-15 PROCEDURE — 3078F DIAST BP <80 MM HG: CPT | Performed by: INTERNAL MEDICINE

## 2022-11-15 PROCEDURE — 1036F TOBACCO NON-USER: CPT | Performed by: INTERNAL MEDICINE

## 2022-11-15 PROCEDURE — G8484 FLU IMMUNIZE NO ADMIN: HCPCS | Performed by: INTERNAL MEDICINE

## 2022-11-15 PROCEDURE — 1090F PRES/ABSN URINE INCON ASSESS: CPT | Performed by: INTERNAL MEDICINE

## 2022-11-15 PROCEDURE — G8428 CUR MEDS NOT DOCUMENT: HCPCS | Performed by: INTERNAL MEDICINE

## 2022-11-15 PROCEDURE — 1123F ACP DISCUSS/DSCN MKR DOCD: CPT | Performed by: INTERNAL MEDICINE

## 2022-11-15 PROCEDURE — 3017F COLORECTAL CA SCREEN DOC REV: CPT | Performed by: INTERNAL MEDICINE

## 2022-11-15 PROCEDURE — 3074F SYST BP LT 130 MM HG: CPT | Performed by: INTERNAL MEDICINE

## 2022-11-15 RX ORDER — LEVOTHYROXINE SODIUM 88 UG/1
88 TABLET ORAL DAILY
Qty: 30 TABLET | Refills: 5 | Status: SHIPPED | OUTPATIENT
Start: 2022-11-15

## 2022-11-15 RX ORDER — SODIUM FLUORIDE 6 MG/ML
PASTE, DENTIFRICE DENTAL
COMMUNITY
Start: 2022-09-01

## 2022-11-15 ASSESSMENT — ENCOUNTER SYMPTOMS
ROS SKIN COMMENTS: DENIES HAIR LOSS, DRY SKIN.
DIARRHEA: 0
CONSTIPATION: 0

## 2022-11-15 NOTE — PROGRESS NOTES
Rod Stephens MD, [facility]  [address], [address] [facility], [address] [place] [address]  Phone [phone]  Facsimile [phone]          Samara Ornelas is a 71 y.o. female seen 11/15/2022 for follow-up of thyroid cancer and hypothyroidism        ASSESSMENT AND PLAN:    1. Thyroid cancer 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 Although there were no high risk features on final pathology, her postoperative thyroglobulin level was higher than expected in the setting of markedly elevated thyroglobulin antibodies. Neck ultrasound 9/2022 revealed abnormal bilateral cervical lymph nodes. She is status post benign FNA biopsy of the right level 3 lymph node 9/2022. Thyroglobulin washout was negative as well. Chest CT 9/2022 revealed stable pulmonary micronodules in the right lung. We elected to treat her with radioactive iodine, which she received 11/9/2022 (dose unknown because the report is unavailable). She is scheduled for her posttreatment whole-body scan tomorrow. Follow-up in 3 months with a thyroglobulin level prior to visit. 2. Pulmonary nodules  See above discussion. I will likely repeat a chest CT in approximately 3/2023. 3. Postsurgical hypothyroidism  Goal TSH 0.1-0.4 for now. She is currently under replaced and I will increase her Euthyrox as below. I do not think her current symptoms are related to her thyroid gland since they were present when she was thyrotoxic and also now that she is euthyroid. - EUTHYROX 88 MCG tablet; Take 1 tablet by mouth Daily  Dispense: 30 tablet; Refill: 5      Follow-up and Dispositions    Return in about 3 months (around 2/15/2023), or Friday afternoon is okay.          HISTORY OF PRESENT ILLNESS:    THYROID CANCER    Presentation: Multinodular goiter with Afirma suspicious left lobe nodule status post total thyroidectomy and central lymphadenectomy 6/10/2022 (pathology revealed 1. 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 Surgical complications: None. Current symptoms: Denies neck lumps, lymphadenopathy. She reports occasional dysphagia. Denies hoarseness. Imaging:  Thyroid ultrasound 4/5/2022: Right lobe 6.1 x 3.0 x 3.1 cm, heterogeneous echotexture. There is a large mixed solid and cystic isoechoic nodule measuring 4.4 x 2.7 x 2.5 cm (TR 2). Left lobe 5.0 x 2.1 x 2.0 cm, heterogeneous echotexture. There is a solid isoechoic nodule measuring 3.1 x 1.7 x 1.7 cm, wider than tall, smooth margins, no calcifications (TR 3). Limited thyroid ultrasound 5/5/2022: There is a predominantly solid isoechoic nodule occupying the majority of the right lobe measuring 2.24 x 2.47 x 4.25 cm without microcalcifications (TR 3). At the junction of the isthmus and right lobe there is a hypoechoic nodule measuring 0.43 x 0.61 x 0.69 cm containing punctate echogenic foci (TR 5). In the mid to inferior left lobe there is a predominantly solid isoechoic nodule measuring 1.30 x 1.71 x 2.7 cm without microcalcifications (TR 3). In the inferior left lobe adjacent to the isthmus there is a complex isoechoic nodule measuring 0.63 x 0.86 x 0.93 cm (TR 2).   There is an abnormal right level 2 lymph node measuring 0.85 x 1.51 x 1.91 cm without obvious fatty hilum. Just superiorly is a right level 2 lymph node measuring 0.56 x 1.27 x 2.09 cm without an obvious fatty hilum. There is a right level 4 lymph node measuring 0.42 x 0.77 x 0.97 cm without obvious fatty hilum. There is a right level 4 lymph node measuring 0.53 x 0.71 x 0.75 cm without an obvious fatty perlita. There are a couple of abnormal left level 2 lymph node measuring 0.40 x 0.73 x 0.80 cm and 0.40 x 0.59 x 0.70 cm, respectively. Neither of these lymph nodes contain obvious fatty perlita. FNA biopsy right level 2 cervical lymph node 5/5/2022: Tg washout <2.0. CT chest without contrast 9/15/2022: Interval stability of small scattered right lung nodules measuring up to 5 mm (compared to 3/2022). New dependent focal pleural densities are seen in the right hemithorax. These are not suspicious, either representing focal pleural thickening or atelectasis. Otherwise only mild scarring is seen at the left lung base. No new suspicious pulmonary lesion. No aggressive osseous lesion. Neck ultrasound 9/15/2022: Right level 2 lymph node measuring 1.8 x 0.9 x 1.3 cm with preserved fatty hilum and thickened hypoechoic cortex. Right level 3 hypoechoic lymph node measuring 2.0 x 0.9 x 1.4 cm with loss of normal fatty hilum. Similar morphologically abnormal but nonenlarged lymph node at right level 4 measuring 0.8 x 0.7 x 0.8 cm. Left level 3 hypoechoic lymph node measuring 1.1 x 0.6 x 0.9 cm with absent fatty hilum. Several other nonenlarged but similar hypoechoic lymph nodes with absent fatty perlita are seen on the left. FNA biopsy right level 3 cervical lymph node 9/29/2022: No malignant cells identified, Tg washout <4.0. Labs:  4/28/2022: TSH 0.497.  7/2/2022: TSH 0.015, free T4 2.67.  7/18/2022: TSH 0.033, free T4 1.94.  8/19/2022: TSH 0.022, free T4 1.4, Tg 7.9, Tg Ab 630.4 (on Euthyrox 100 mcg daily).   11/2/2022: TSH 3.24, Tg 1.4, Tg Ab 472.8 (on Euthyrox 75 mcg daily). THYROID DISEASE    Presentation/Diagnosis: Postsurgical hypothyroidism. Symptoms: See review of systems. Treatment: Takes name brand in AM correctly. Review of Systems   Constitutional:  Positive for fatigue (normal during the day and low energy in the evenings). Negative for diaphoresis. Weight increased 5 pounds since last visit. Cardiovascular:  Positive for palpitations (in the evenings; pulse is normal). Gastrointestinal:  Negative for constipation and diarrhea. Endocrine: Negative for cold intolerance and heat intolerance. Skin:         Denies hair loss, dry skin. Neurological:  Positive for tremors (in the evenings) and weakness (generalized weakness in the evenings). Psychiatric/Behavioral:  Positive for sleep disturbance. Vital Signs:  BP (!) 149/76   Pulse 72   Resp 18   Ht 5' 4\" (1.626 m)   Wt 181 lb (82.1 kg)   BMI 31.07 kg/m²     Wt Readings from Last 3 Encounters:   11/15/22 181 lb (82.1 kg)   11/08/22 176 lb (79.8 kg)   11/07/22 176 lb 9.6 oz (80.1 kg)       Physical Exam  Constitutional:       General: She is not in acute distress. Neck:      Comments: Thyroidectomy scar. Cardiovascular:      Rate and Rhythm: Normal rate and regular rhythm. Lymphadenopathy:      Cervical: No cervical adenopathy. Neurological:      Motor: No tremor.          Orders Placed This Encounter   Procedures    TSH with Reflex     Standing Status:   Future     Standing Expiration Date:   11/15/2023    Thyroglobulin Panel     Standing Status:   Future     Standing Expiration Date:   11/15/2023         Current Outpatient Medications   Medication Sig Dispense Refill    PREVIDENT 5000 BOOSTER PLUS 1.1 % PSTE USE PASTE IN THE MORNING AND EVENING TO BRUSH TEETH (DO NOT EAT, DRINK OR RINSE FOR 30 MINUTE AFTER)      EUTHYROX 88 MCG tablet Take 1 tablet by mouth Daily 30 tablet 5    aspirin 81 MG EC tablet Take 81 mg by mouth daily      atorvastatin (LIPITOR) 20 MG tablet Take 20 mg by mouth daily      melatonin 5 MG TABS tablet Take by mouth      pantoprazole (PROTONIX) 40 MG tablet Take 40 mg by mouth daily      verapamil (CALAN) 120 MG tablet Take 120 mg by mouth in the morning and at bedtime      zolpidem (AMBIEN) 10 MG tablet Take 5 mg by mouth. No current facility-administered medications for this visit.

## 2022-11-16 ENCOUNTER — HOSPITAL ENCOUNTER (OUTPATIENT)
Dept: NUCLEAR MEDICINE | Age: 72
Discharge: HOME | End: 2022-11-19
Payer: MEDICARE

## 2022-11-16 DIAGNOSIS — C73 THYROID CANCER (HCC): ICD-10-CM

## 2022-11-16 PROCEDURE — 78830 RP LOCLZJ TUM SPECT W/CT 1: CPT

## 2022-12-14 DIAGNOSIS — M54.16 LUMBAR RADICULOPATHY: Primary | ICD-10-CM

## 2023-01-03 ENCOUNTER — PATIENT MESSAGE (OUTPATIENT)
Dept: FAMILY MEDICINE CLINIC | Facility: CLINIC | Age: 73
End: 2023-01-03

## 2023-01-03 DIAGNOSIS — F51.01 PRIMARY INSOMNIA: Primary | ICD-10-CM

## 2023-01-03 RX ORDER — ZOLPIDEM TARTRATE 10 MG/1
TABLET ORAL
Qty: 30 TABLET | Refills: 0 | Status: SHIPPED | OUTPATIENT
Start: 2023-01-03 | End: 2023-02-09 | Stop reason: SDUPTHER

## 2023-01-04 RX ORDER — PANTOPRAZOLE SODIUM 40 MG/1
40 TABLET, DELAYED RELEASE ORAL DAILY
Qty: 90 TABLET | Refills: 3 | Status: SHIPPED | OUTPATIENT
Start: 2023-01-04

## 2023-01-04 NOTE — TELEPHONE ENCOUNTER
From: Queen Heath  To: Dr. Catarino Jamil: 1/3/2023 11:17 PM EST  Subject: Ariel Hensley Doctor,  please renew prescriptions for medicines Pantoprazole, Zolpidem for me at [address] [place] [address]. Thank you and have Happy New year for you and your family,  Queen Heath.

## 2023-01-09 ENCOUNTER — OFFICE VISIT (OUTPATIENT)
Dept: ORTHOPEDIC SURGERY | Age: 73
End: 2023-01-09
Payer: MEDICARE

## 2023-01-09 DIAGNOSIS — M54.50 LOW BACK PAIN, UNSPECIFIED BACK PAIN LATERALITY, UNSPECIFIED CHRONICITY, UNSPECIFIED WHETHER SCIATICA PRESENT: Primary | ICD-10-CM

## 2023-01-09 PROCEDURE — G8484 FLU IMMUNIZE NO ADMIN: HCPCS | Performed by: PHYSICIAN ASSISTANT

## 2023-01-09 PROCEDURE — 99203 OFFICE O/P NEW LOW 30 MIN: CPT | Performed by: PHYSICIAN ASSISTANT

## 2023-01-09 PROCEDURE — 1090F PRES/ABSN URINE INCON ASSESS: CPT | Performed by: PHYSICIAN ASSISTANT

## 2023-01-09 PROCEDURE — G8417 CALC BMI ABV UP PARAM F/U: HCPCS | Performed by: PHYSICIAN ASSISTANT

## 2023-01-09 PROCEDURE — 1036F TOBACCO NON-USER: CPT | Performed by: PHYSICIAN ASSISTANT

## 2023-01-09 PROCEDURE — 3017F COLORECTAL CA SCREEN DOC REV: CPT | Performed by: PHYSICIAN ASSISTANT

## 2023-01-09 PROCEDURE — 1123F ACP DISCUSS/DSCN MKR DOCD: CPT | Performed by: PHYSICIAN ASSISTANT

## 2023-01-09 PROCEDURE — G8400 PT W/DXA NO RESULTS DOC: HCPCS | Performed by: PHYSICIAN ASSISTANT

## 2023-01-09 PROCEDURE — G8427 DOCREV CUR MEDS BY ELIG CLIN: HCPCS | Performed by: PHYSICIAN ASSISTANT

## 2023-01-09 NOTE — LETTER
[facility]                                                     DESIRE RENAE  1950  MRN [id number]                                              ROOM NUMBER______      Radiographic Studies:    Cervical MRI      Thoracic MRI         Lumbar MRI          Pelvis MRI        CONTRAST    CT Myelogram: _______________   NCS/EMG ________________ ( UE  /  LE )     MRI of ___________________          Other: ____________________      Injections:    KNEE    HIP  Depomedrol _____ mg Euflexxa _____    _______________ TFESI/SNRB  _______________ SI Joint  _______________ CHRISTIANA    _______________ Facet  _______________Piriformis/ Sciatica      Medications:    Oral Steroids _______________  NSAIDS _______________    Muscle Relaxers _______________  Neurontin/Lyrica _______________    Pain Medicine _______________  Other _______________                       Physical Therapy:    Lumbar     Thoracic      Cervical     Hip       Knee       Shoulder               Traction          Ultrasound          Dry Needling      Referral:    Pain referral:  [facility]   [facility]   Other: ______________________________    Follow-up/ Refer__________________________________________________    Authorization to hold blood thinners:___________________________________

## 2023-01-09 NOTE — PROGRESS NOTES
Name: Michael Ward  YOB: 1950  Gender: female  MRN: [id number]    CC: Back Pain (Low back pain)       HPI: This is a 68y.o. year old female who  has a past medical history of Hypercholesterolemia. .  Patient presented today with her . [employer]  was used for this visit. She is a former OB/GYN. Recently moved to the [facility] area. Reports was diagnosed with lumbar degenerative changes and discs that initially was in her left leg and then is traveled over to the right hip. She had MRI scan of lumbar spine 2014. Has not had any further imaging since then. She was under the care of the same physician in [place] who did lumbar epidural steroid injections. The last lumbar epidural steroid injection was believe June 2021. Lumbar injections are usually very effective. She has had some exacerbations of lower back pain tends to be more to the right hip now than it was previously. Symptoms are worse with standing and walking when she stands over an hour pain is present. Leaning over also increases her lower back pain. This patient  has not had lumbar surgery in the past.     Thus far, the patient has tried NSAIDS, muscle relaxers, spine injections , accupuncture, and massage therapy  Past 4 years, 4 CHRISTIANA's that are usually very effective. She is here today for establish care. She has not having significant exacerbation at this time and does not feel she would need an epidural steroid injection. AMB PAIN ASSESSMENT 1/9/2023   Location of Pain Back   Duration of Pain Persistent   Frequency of Pain Constant   Limiting Behavior No   Result of Injury No   Work-Related Injury No   Are there other pain locations you wish to document? No            ROS/Meds/PSH/PMH/FH/SH: I personally reviewed the patient's collected intake data.   Below are the pertinents:    No Known Allergies      Current Outpatient Medications:     pantoprazole (PROTONIX) 40 MG tablet, Take 1 tablet by mouth daily, Disp: 90 tablet, Rfl: 3    zolpidem (AMBIEN) 10 MG tablet, TAKE 1/2 (ONE-HALF) TABLET BY MOUTH NIGHTLY AS NEEDED FOR SLEEP, Disp: 30 tablet, Rfl: 0    PREVIDENT 5000 BOOSTER PLUS 1.1 % PSTE, USE PASTE IN THE MORNING AND EVENING TO BRUSH TEETH (DO NOT EAT, DRINK OR RINSE FOR 30 MINUTE AFTER), Disp: , Rfl:     EUTHYROX 88 MCG tablet, Take 1 tablet by mouth Daily, Disp: 30 tablet, Rfl: 5    aspirin 81 MG EC tablet, Take 81 mg by mouth daily, Disp: , Rfl:     atorvastatin (LIPITOR) 20 MG tablet, Take 20 mg by mouth daily, Disp: , Rfl:     melatonin 5 MG TABS tablet, Take by mouth, Disp: , Rfl:     verapamil (CALAN) 120 MG tablet, Take 120 mg by mouth in the morning and at bedtime, Disp: , Rfl:     Past Surgical History:   Procedure Laterality Date    APPENDECTOMY  1979    HEMORRHOID SURGERY      OTHER SURGICAL HISTORY Right     Resection of Ovarian Cyst     US LYMPH NODE BIOPSY  9/29/2022    US LYMPH NODE BIOPSY 9/29/2022 Oziel Amador PA-C [facility]       Patient Active Problem List   Diagnosis    Hypertension    Lumbar radiculopathy    Degeneration of intervertebral disc of lumbar region    Hypokalemia    Hyperlipidemia    GERD (gastroesophageal reflux disease)    Palpitations    Overweight (BMI 25.0-29. 9)    Atypical chest pain    Pulmonary nodules    Cervical lymphadenopathy    Thyroid cancer ([facility] [address]    Postsurgical hypothyroidism         Tobacco:  reports that she has never smoked. She has never used smokeless tobacco.  Alcohol:   Social History     Substance and Sexual Activity   Alcohol Use Never        Physical Exam:   @VS1@   GENERAL:  Adult in no acute distress, well developed, well nourished Patient is appropriately conversant  MSK:  Examination of the lumbar spine reveals scoliosis    There is no tenderness to palpation along the spinous processes and paraspinal musculature. The patient ambulates with a normal gait.     ROM of bilateral hip(s) reveals no irritability. NEURO:  Cranial nerves grossly intact. No motor deficits. Straight leg testing is negative bilateral  Sensory testing reveals intact sensation to light touch and in the distribution of the L3-S1 dermatomes bilaterally  Ankle jerk is negative for clonus    Reflexes   Right Left   Quadriceps (L4) 2 2   Achilles (S1) 2 2     Strength testing in the lower extremity reveals the following based on the 5 point grading scale:     HF (L2) H Ab (L5) KE (L3/4) ADF (L4) EHL (L5) A Ev (S1) APF (S1)   Right 5 5 5 5 5 5 5   Left 5 5 5 5 5 5 5     PSYCH:  Alert and oriented X 3. Appropriate affect. Intact judgment and insight. Radiographic Studies:     Patient did want to have x-rays today. She has history of cancer and has had multiple x-rays and CT scans within the past couple of years. I have report from her MRI scan in 2014 which L4-5 which showed mild to moderate disc degeneration mild circumferential disc bulge with a left articular through foraminal protrusion extrusion but no exiting L4 V nerve root impingement. There is also facet arthropathy. L5-S1 had moderate to severe disc degeneration disc bulging left foraminal to far lateral shallow protrusion endplate osteophyte abutting the exiting left L5 nerve but no right. There was nothing that looked like it caused right radicular symptoms on this report. Assessment/Plan:         Diagnosis Orders   1. Low back pain, unspecified back pain laterality, unspecified chronicity, unspecified whether sciatica present              This patient's clinical history and physical exam is consistent with neurogenic claudication which is likely due to lumbar stenosis. Have evidence of scoliosis on exam.  I suspect she has had progression of lumbar degenerative changes and likely has spinal stenosis that has progressed compared to her prior MRI scan in 2014.     I discussed the natural history of lumbar stenosis in that it is a degenerative condition that is usually slowly progressive resulting in gradual loss of mobility. I reassured the patient that this is not a condition that typically predisposes her  to an acute paraplegia; however, the more neurologic deficits acquired and the longer they go untreated, the less likely she is to have neurological improvements after an operation. She understands that conservative treatments typically include physical therapy, oral and/or epidural steroids, pain management, and simple observation. And, that these treatments do not address the anatomical pinching of the spinal nerves, but rather help patients cope with the resulting symptoms. I    -The patient will be treated observantly with self directed symptomatic care. Instructions were given to follow up if there is any neurologic or functional decline. - A home exercise program was prescribed for stretching and strengthening. A list of exercises was provided. - If the patient fails to respond to these efforts, I would recommend MRI of the lumbar spine for further evaluation. MRI scan would be helpful in fully delineating current anatomy and outlining recommendations for lumbar injections. I instructed her that she can call or MyChart if her symptoms worsen and we need to pursue further imaging which would likely be MRI scan of the lumbar spine. Upon any surgical discussion, we would need x-rays of the lumbar spine to evaluate for stability. No orders of the defined types were placed in this encounter. No orders of the defined types were placed in this encounter. 3 This is stable chronic illness/condition      Return if symptoms worsen or fail to improve, call for MRI of lumbar spine, for call for MRI. Lexi Bonilla PA-C  01/09/23      Elements of this note were created using speech recognition software. As such, errors of speech recognition may be present.

## 2023-01-10 RX ORDER — PANTOPRAZOLE SODIUM 40 MG/1
40 TABLET, DELAYED RELEASE ORAL DAILY
Qty: 90 TABLET | Refills: 3 | Status: SHIPPED | OUTPATIENT
Start: 2023-01-10

## 2023-02-08 DIAGNOSIS — E89.0 POSTSURGICAL HYPOTHYROIDISM: ICD-10-CM

## 2023-02-08 DIAGNOSIS — C73 THYROID CANCER (HCC): Primary | ICD-10-CM

## 2023-02-09 DIAGNOSIS — F51.01 PRIMARY INSOMNIA: ICD-10-CM

## 2023-02-09 RX ORDER — VERAPAMIL HYDROCHLORIDE 120 MG/1
120 TABLET, FILM COATED ORAL 2 TIMES DAILY
Qty: 30 TABLET | Refills: 5 | Status: SHIPPED | OUTPATIENT
Start: 2023-02-09

## 2023-02-09 RX ORDER — ZOLPIDEM TARTRATE 10 MG/1
TABLET ORAL
Qty: 30 TABLET | Refills: 5 | Status: SHIPPED | OUTPATIENT
Start: 2023-02-09 | End: 2023-08-09

## 2023-02-09 RX ORDER — PANTOPRAZOLE SODIUM 40 MG/1
40 TABLET, DELAYED RELEASE ORAL DAILY
Qty: 90 TABLET | Refills: 3 | Status: SHIPPED | OUTPATIENT
Start: 2023-02-09

## 2023-02-09 NOTE — TELEPHONE ENCOUNTER
Medication Refill Request      Name of Medication : Pantropazole        Strength of Medication: 40 mg        Directions: 1 daily      30 day or 90 day supply: 90 day supply      [facility]acy: [facility]    Additional Information For Provider:      Medication Refill Request      Name of Medication : verapamil        Strength of Medication: 120 mg        Directions: 1 daily       30 day or 90 day supply: 90 day supply       Preferred [facility]acy: [facility]    Additional Information For Provider:       Zolpidiem   ?   Not sure strength and its not on her list

## 2023-02-21 ENCOUNTER — OFFICE VISIT (OUTPATIENT)
Dept: ENDOCRINOLOGY | Age: 73
End: 2023-02-21
Payer: MEDICARE

## 2023-02-21 VITALS
BODY MASS INDEX: 30.55 KG/M2 | HEART RATE: 110 BPM | WEIGHT: 178 LBS | OXYGEN SATURATION: 96 % | DIASTOLIC BLOOD PRESSURE: 66 MMHG | SYSTOLIC BLOOD PRESSURE: 108 MMHG

## 2023-02-21 DIAGNOSIS — C73 THYROID CANCER (HCC): Primary | ICD-10-CM

## 2023-02-21 DIAGNOSIS — R91.8 PULMONARY NODULES: ICD-10-CM

## 2023-02-21 DIAGNOSIS — E89.0 POSTSURGICAL HYPOTHYROIDISM: ICD-10-CM

## 2023-02-21 PROCEDURE — G8427 DOCREV CUR MEDS BY ELIG CLIN: HCPCS | Performed by: INTERNAL MEDICINE

## 2023-02-21 PROCEDURE — 1123F ACP DISCUSS/DSCN MKR DOCD: CPT | Performed by: INTERNAL MEDICINE

## 2023-02-21 PROCEDURE — 99214 OFFICE O/P EST MOD 30 MIN: CPT | Performed by: INTERNAL MEDICINE

## 2023-02-21 PROCEDURE — 3017F COLORECTAL CA SCREEN DOC REV: CPT | Performed by: INTERNAL MEDICINE

## 2023-02-21 PROCEDURE — G8400 PT W/DXA NO RESULTS DOC: HCPCS | Performed by: INTERNAL MEDICINE

## 2023-02-21 PROCEDURE — 1090F PRES/ABSN URINE INCON ASSESS: CPT | Performed by: INTERNAL MEDICINE

## 2023-02-21 PROCEDURE — 1036F TOBACCO NON-USER: CPT | Performed by: INTERNAL MEDICINE

## 2023-02-21 PROCEDURE — G8484 FLU IMMUNIZE NO ADMIN: HCPCS | Performed by: INTERNAL MEDICINE

## 2023-02-21 PROCEDURE — G8417 CALC BMI ABV UP PARAM F/U: HCPCS | Performed by: INTERNAL MEDICINE

## 2023-02-21 PROCEDURE — 3074F SYST BP LT 130 MM HG: CPT | Performed by: INTERNAL MEDICINE

## 2023-02-21 PROCEDURE — 3078F DIAST BP <80 MM HG: CPT | Performed by: INTERNAL MEDICINE

## 2023-02-21 RX ORDER — LEVOTHYROXINE SODIUM 88 UG/1
TABLET ORAL
Qty: 100 TABLET | Refills: 3 | Status: SHIPPED | OUTPATIENT
Start: 2023-02-21

## 2023-02-21 ASSESSMENT — ENCOUNTER SYMPTOMS
ROS SKIN COMMENTS: DENIES HAIR LOSS, DRY SKIN.
DIARRHEA: 0
CONSTIPATION: 0

## 2023-02-21 NOTE — PROGRESS NOTES
Rod Alegre MD, [facility]  [address], [address] [facility], [address] [place] [address]  Phone [phone]  Facsimile [phone]          Alex Gutierrez is a 68 y.o. female seen 2/21/2023 for follow-up of thyroid cancer and hypothyroidism        ASSESSMENT AND PLAN:    1. 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 Although there were no high risk features on final pathology, her postoperative thyroglobulin level was higher than expected in the setting of markedly elevated thyroglobulin antibodies. Neck ultrasound 9/2022 revealed abnormal bilateral cervical lymph nodes. She is status post benign FNA biopsy of the right level 3 lymph node 9/2022. Thyroglobulin washout was negative as well. Chest CT 9/2022 revealed stable pulmonary micronodules in the right lung. She is status post MUHAMMAD-131 103 mCi 11/2022. Posttreatment whole-body scan revealed uptake in the neck only as expected. Her thyroglobulin antibodies remain markedly elevated and I am awaiting her thyroglobulin by TESSA. I will repeat a neck ultrasound and chest CT at this time. Follow-up in 3 months with a thyroglobulin level prior to visit. 2. Pulmonary nodules  See above discussion. 3. Postsurgical hypothyroidism  Goal TSH 0.1-0.4 for now. She is currently under replaced and I will increase her Euthyrox as below. - EUTHYROX 88 MCG tablet; 1 tablet once a day with an extra 1/2 tablet on Sundays  Dispense: 100 tablet; Refill: 3      Follow-up and Dispositions    Return in about 3 months (around 5/21/2023), or Friday afternoon is okay.          HISTORY OF PRESENT ILLNESS:    THYROID CANCER    Presentation: Multinodular goiter with Afirma suspicious left lobe nodule status post total thyroidectomy and central lymphadenectomy 6/10/2022 (pathology revealed 1. 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 Surgical complications: None. Current symptoms: Denies neck lumps, lymphadenopathy. She reports occasional dysphagia. Denies hoarseness. Imaging:  Thyroid ultrasound 4/5/2022: Right lobe 6.1 x 3.0 x 3.1 cm, heterogeneous echotexture. There is a large mixed solid and cystic isoechoic nodule measuring 4.4 x 2.7 x 2.5 cm (TR 2). Left lobe 5.0 x 2.1 x 2.0 cm, heterogeneous echotexture. There is a solid isoechoic nodule measuring 3.1 x 1.7 x 1.7 cm, wider than tall, smooth margins, no calcifications (TR 3). Limited thyroid ultrasound 5/5/2022: There is a predominantly solid isoechoic nodule occupying the majority of the right lobe measuring 2.24 x 2.47 x 4.25 cm without microcalcifications (TR 3). At the junction of the isthmus and right lobe there is a hypoechoic nodule measuring 0.43 x 0.61 x 0.69 cm containing punctate echogenic foci (TR 5). In the mid to inferior left lobe there is a predominantly solid isoechoic nodule measuring 1.30 x 1.71 x 2.7 cm without microcalcifications (TR 3). In the inferior left lobe adjacent to the isthmus there is a complex isoechoic nodule measuring 0.63 x 0.86 x 0.93 cm (TR 2). There is an abnormal right level 2 lymph node measuring 0.85 x 1.51 x 1.91 cm without obvious fatty hilum.   Just superiorly is a right level 2 lymph node measuring 0.56 x 1.27 x 2.09 cm without an obvious fatty hilum. There is a right level 4 lymph node measuring 0.42 x 0.77 x 0.97 cm without obvious fatty hilum. There is a right level 4 lymph node measuring 0.53 x 0.71 x 0.75 cm without an obvious fatty perlita. There are a couple of abnormal left level 2 lymph node measuring 0.40 x 0.73 x 0.80 cm and 0.40 x 0.59 x 0.70 cm, respectively. Neither of these lymph nodes contain obvious fatty perlita. FNA biopsy right level 2 cervical lymph node 5/5/2022: Tg washout <2.0. CT chest without contrast 9/15/2022: Interval stability of small scattered right lung nodules measuring up to 5 mm (compared to 3/2022). New dependent focal pleural densities are seen in the right hemithorax. These are not suspicious, either representing focal pleural thickening or atelectasis. Otherwise only mild scarring is seen at the left lung base. No new suspicious pulmonary lesion. No aggressive osseous lesion. Neck ultrasound 9/15/2022: Right level 2 lymph node measuring 1.8 x 0.9 x 1.3 cm with preserved fatty hilum and thickened hypoechoic cortex. Right level 3 hypoechoic lymph node measuring 2.0 x 0.9 x 1.4 cm with loss of normal fatty hilum. Similar morphologically abnormal but nonenlarged lymph node at right level 4 measuring 0.8 x 0.7 x 0.8 cm. Left level 3 hypoechoic lymph node measuring 1.1 x 0.6 x 0.9 cm with absent fatty hilum. Several other nonenlarged but similar hypoechoic lymph nodes with absent fatty perlita are seen on the left. FNA biopsy right level 3 cervical lymph node 9/29/2022: No malignant cells identified, Tg washout <4.0. Posttreatment whole-body scan 11/16/2022: Expected radioiodine uptake in the thyroid bed. No other focus of accumulation is seen to suggest metastatic disease. Labs:  4/28/2022: TSH 0.497.  7/2/2022: TSH 0.015, free T4 2.67.  7/18/2022: TSH 0.033, free T4 1.94.  8/19/2022: TSH 0.022, free T4 1.4, Tg 7.9, Tg Ab 630.4 (on Euthyrox 100 mcg daily).   11/2/2022: TSH 3.24, Tg-TESSA 5.4, Tg Ab 472.8 (on Euthyrox 75 mcg daily). 2/8/2023: TSH 0.69, Tg-TESSA pending, Tg Ab 624.4. THYROID DISEASE    Presentation/Diagnosis: Postsurgical hypothyroidism. Symptoms: See review of systems. Treatment: Takes name brand in AM correctly. Review of Systems   Constitutional:  Positive for fatigue (normal during the day and mildly low energy in the evenings). Negative for diaphoresis. Weight decreased 3 pounds since last visit. Cardiovascular:  Positive for palpitations. Gastrointestinal:  Negative for constipation and diarrhea. Endocrine: Negative for cold intolerance and heat intolerance. Skin:         Denies hair loss, dry skin. Neurological:  Positive for tremors (chronic and stable). Vital Signs:  /66   Pulse (!) 110   Wt 178 lb (80.7 kg)   SpO2 96%   BMI 30.55 kg/m²     Wt Readings from Last 3 Encounters:   02/21/23 178 lb (80.7 kg)   11/15/22 181 lb (82.1 kg)   11/08/22 176 lb (79.8 kg)       Physical Exam  Constitutional:       General: She is not in acute distress. Neck:      Comments: Thyroidectomy scar. Cardiovascular:      Rate and Rhythm: Normal rate and regular rhythm. Lymphadenopathy:      Cervical: No cervical adenopathy. Neurological:      Motor: No tremor.          Orders Placed This Encounter   Procedures    CT CHEST WO CONTRAST     Standing Status:   Future     Standing Expiration Date:   2/21/2024    US HEAD NECK SOFT TISSUE THYROID           Standing Status:   Future     Standing Expiration Date:   2/21/2024     Order Specific Question:   Reason for exam:     Answer:   Please evaluate thyroid bed and cervical lymph nodes    TSH with Reflex     Standing Status:   Future     Standing Expiration Date:   2/21/2024    Thyroglobulin Ab and Thyroglobulin, RAULITO or TESSA     Standing Status:   Future     Standing Expiration Date:   2/21/2024           Current Outpatient Medications   Medication Sig Dispense Refill    EUTHYROX 88 MCG tablet 1 tablet once a day with an extra 1/2 tablet on Sundays 100 tablet 3    aspirin 81 MG EC tablet Take 1 tablet by mouth daily 90 tablet 3    atorvastatin (LIPITOR) 20 MG tablet Take 1 tablet by mouth daily 90 tablet 3    verapamil (CALAN) 120 MG tablet Take 1 tablet by mouth in the morning and at bedtime 180 tablet 3    pantoprazole (PROTONIX) 40 MG tablet Take 1 tablet by mouth daily 90 tablet 3    zolpidem (AMBIEN) 5 MG tablet TAKE 1 TABLET BY MOUTH NIGHTLY AS NEEDED FOR SLEEP 30 tablet 5    melatonin 5 MG TABS tablet Take by mouth       No current facility-administered medications for this visit.

## 2023-03-13 ENCOUNTER — HOSPITAL ENCOUNTER (OUTPATIENT)
Dept: MAMMOGRAPHY | Age: 73
Discharge: HOME | End: 2023-03-16
Payer: MEDICARE

## 2023-03-13 DIAGNOSIS — Z12.31 SCREENING MAMMOGRAM FOR BREAST CANCER: ICD-10-CM

## 2023-03-13 PROCEDURE — 77063 BREAST TOMOSYNTHESIS BI: CPT

## 2023-03-14 ENCOUNTER — HOSPITAL ENCOUNTER (OUTPATIENT)
Dept: ULTRASOUND IMAGING | Age: 73
Discharge: HOME | End: 2023-03-17
Payer: MEDICARE

## 2023-03-14 ENCOUNTER — HOSPITAL ENCOUNTER (OUTPATIENT)
Dept: CT IMAGING | Age: 73
Discharge: HOME | End: 2023-03-17
Payer: MEDICARE

## 2023-03-14 DIAGNOSIS — C73 THYROID CANCER (HCC): ICD-10-CM

## 2023-03-14 DIAGNOSIS — R91.8 PULMONARY NODULES: ICD-10-CM

## 2023-03-14 PROCEDURE — 71250 CT THORAX DX C-: CPT

## 2023-03-14 PROCEDURE — 76536 US EXAM OF HEAD AND NECK: CPT

## 2023-03-29 ENCOUNTER — HOSPITAL ENCOUNTER (OUTPATIENT)
Dept: MAMMOGRAPHY | Age: 73
Discharge: HOME | End: 2023-04-01
Payer: MEDICARE

## 2023-03-29 DIAGNOSIS — R92.8 ABNORMAL SCREENING MAMMOGRAM: ICD-10-CM

## 2023-03-29 PROCEDURE — 77065 DX MAMMO INCL CAD UNI: CPT

## 2023-04-01 DIAGNOSIS — E89.0 POSTSURGICAL HYPOTHYROIDISM: ICD-10-CM

## 2023-04-03 RX ORDER — LEVOTHYROXINE SODIUM 88 UG/1
TABLET ORAL
Qty: 100 TABLET | Refills: 3 | OUTPATIENT
Start: 2023-04-03

## 2023-05-02 ENCOUNTER — OFFICE VISIT (OUTPATIENT)
Dept: ENDOCRINOLOGY | Age: 73
End: 2023-05-02
Payer: MEDICARE

## 2023-05-02 VITALS
HEART RATE: 88 BPM | DIASTOLIC BLOOD PRESSURE: 80 MMHG | WEIGHT: 181 LBS | OXYGEN SATURATION: 99 % | BODY MASS INDEX: 31.07 KG/M2 | SYSTOLIC BLOOD PRESSURE: 140 MMHG

## 2023-05-02 DIAGNOSIS — E89.0 POSTSURGICAL HYPOTHYROIDISM: ICD-10-CM

## 2023-05-02 DIAGNOSIS — C73 THYROID CANCER (HCC): Primary | ICD-10-CM

## 2023-05-02 DIAGNOSIS — R91.8 PULMONARY NODULES: ICD-10-CM

## 2023-05-02 PROCEDURE — 3017F COLORECTAL CA SCREEN DOC REV: CPT | Performed by: INTERNAL MEDICINE

## 2023-05-02 PROCEDURE — G8417 CALC BMI ABV UP PARAM F/U: HCPCS | Performed by: INTERNAL MEDICINE

## 2023-05-02 PROCEDURE — 99214 OFFICE O/P EST MOD 30 MIN: CPT | Performed by: INTERNAL MEDICINE

## 2023-05-02 PROCEDURE — 1090F PRES/ABSN URINE INCON ASSESS: CPT | Performed by: INTERNAL MEDICINE

## 2023-05-02 PROCEDURE — G8400 PT W/DXA NO RESULTS DOC: HCPCS | Performed by: INTERNAL MEDICINE

## 2023-05-02 PROCEDURE — 1036F TOBACCO NON-USER: CPT | Performed by: INTERNAL MEDICINE

## 2023-05-02 PROCEDURE — 3077F SYST BP >= 140 MM HG: CPT | Performed by: INTERNAL MEDICINE

## 2023-05-02 PROCEDURE — G8427 DOCREV CUR MEDS BY ELIG CLIN: HCPCS | Performed by: INTERNAL MEDICINE

## 2023-05-02 PROCEDURE — 1123F ACP DISCUSS/DSCN MKR DOCD: CPT | Performed by: INTERNAL MEDICINE

## 2023-05-02 PROCEDURE — 3079F DIAST BP 80-89 MM HG: CPT | Performed by: INTERNAL MEDICINE

## 2023-05-02 RX ORDER — LEVOTHYROXINE SODIUM 88 UG/1
TABLET ORAL
Qty: 100 TABLET | Refills: 3
Start: 2023-05-02

## 2023-05-02 ASSESSMENT — ENCOUNTER SYMPTOMS
ROS SKIN COMMENTS: DENIES HAIR LOSS, DRY SKIN.
CONSTIPATION: 0
DIARRHEA: 0
COUGH: 1
SHORTNESS OF BREATH: 0

## 2023-05-02 NOTE — PROGRESS NOTES
Rod Foster MD, [facility]  [address], [address] [address] [facility], [address]  Phone [phone]  Facsimile [phone]          Brook Goff is a 68 y.o. female seen 5/2/2023 for follow-up of thyroid cancer and hypothyroidism        ASSESSMENT AND PLAN:    1. 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 Although there were no high risk features on final pathology, her postoperative thyroglobulin level was higher than expected in the setting of markedly elevated thyroglobulin antibodies. Neck ultrasound 9/2022 revealed abnormal bilateral cervical lymph nodes. She is status post benign FNA biopsy of the right level 3 lymph node 9/2022. Thyroglobulin washout was negative as well. Chest CT 9/2022 revealed stable pulmonary micronodules in the right lung. She is status post MUHAMMAD-131 103 mCi 11/2022. Posttreatment whole-body scan revealed uptake in the neck only as expected. Her thyroglobulin antibodies remain markedly elevated but have decreased. Her thyroglobulin level has decreased as well. Neck ultrasound 3/2023 revealed that the lymph nodes were stable compared to the prior examination. Chest CT 3/2023 revealed a slight increase in the size of the right lower lobe pulmonary nodule. There is also a new pleural-based left lower lobe pulmonary nodule. I will plan on repeating a chest CT in 8/5141 and I will certainly consider a pulmonary referral if there have been any further changes. Follow-up in 9/2023 with a thyroglobulin level prior to visit. 2. Pulmonary nodules  See above discussion. 3. Postsurgical hypothyroidism  TSH at goal 0.1-0.4 for now.        - EUTHYROX 88 MCG tablet; 1 tablet once a day

## 2023-05-28 DIAGNOSIS — E89.0 POSTSURGICAL HYPOTHYROIDISM: ICD-10-CM

## 2023-05-28 RX ORDER — LEVOTHYROXINE SODIUM 88 UG/1
TABLET ORAL
Qty: 100 TABLET | Refills: 3 | Status: CANCELLED | OUTPATIENT
Start: 2023-05-28

## 2023-05-30 DIAGNOSIS — E89.0 POSTSURGICAL HYPOTHYROIDISM: ICD-10-CM

## 2023-05-30 RX ORDER — LEVOTHYROXINE SODIUM 88 UG/1
TABLET ORAL
Qty: 100 TABLET | Refills: 3 | Status: SHIPPED | OUTPATIENT
Start: 2023-05-30

## 2023-06-21 ENCOUNTER — APPOINTMENT (OUTPATIENT)
Dept: GENERAL RADIOLOGY | Age: 73
End: 2023-06-21
Payer: MEDICARE

## 2023-06-21 ENCOUNTER — HOSPITAL ENCOUNTER (EMERGENCY)
Age: 73
Discharge: HOME | End: 2023-06-22
Attending: EMERGENCY MEDICINE
Payer: MEDICARE

## 2023-06-21 DIAGNOSIS — R00.2 PALPITATIONS: Primary | ICD-10-CM

## 2023-06-21 DIAGNOSIS — E83.42 HYPOMAGNESEMIA: ICD-10-CM

## 2023-06-21 LAB
ERYTHROCYTE [DISTWIDTH] IN BLOOD BY AUTOMATED COUNT: 12.7 % (ref 11.9–14.6)
HCT VFR BLD AUTO: 39 % (ref 35.8–46.3)
HGB BLD-MCNC: 13.3 G/DL (ref 11.7–15.4)
MCH RBC QN AUTO: 29.4 PG (ref 26.1–32.9)
MCHC RBC AUTO-ENTMCNC: 34.1 G/DL (ref 31.4–35)
MCV RBC AUTO: 86.1 FL (ref 82–102)
NRBC # BLD: 0 K/UL (ref 0–0.2)
PLATELET # BLD AUTO: 302 K/UL (ref 150–450)
PMV BLD AUTO: 10.1 FL (ref 9.4–12.3)
RBC # BLD AUTO: 4.53 M/UL (ref 4.05–5.2)
WBC # BLD AUTO: 6.7 K/UL (ref 4.3–11.1)

## 2023-06-21 PROCEDURE — 80053 COMPREHEN METABOLIC PANEL: CPT

## 2023-06-21 PROCEDURE — 84480 ASSAY TRIIODOTHYRONINE (T3): CPT

## 2023-06-21 PROCEDURE — 71046 X-RAY EXAM CHEST 2 VIEWS: CPT

## 2023-06-21 PROCEDURE — 84439 ASSAY OF FREE THYROXINE: CPT

## 2023-06-21 PROCEDURE — 84443 ASSAY THYROID STIM HORMONE: CPT

## 2023-06-21 PROCEDURE — 93005 ELECTROCARDIOGRAM TRACING: CPT | Performed by: EMERGENCY MEDICINE

## 2023-06-21 PROCEDURE — 83735 ASSAY OF MAGNESIUM: CPT

## 2023-06-21 PROCEDURE — 96365 THER/PROPH/DIAG IV INF INIT: CPT

## 2023-06-21 PROCEDURE — 84484 ASSAY OF TROPONIN QUANT: CPT

## 2023-06-21 PROCEDURE — 85027 COMPLETE CBC AUTOMATED: CPT

## 2023-06-21 PROCEDURE — 99285 EMERGENCY DEPT VISIT HI MDM: CPT

## 2023-06-21 ASSESSMENT — PAIN DESCRIPTION - DESCRIPTORS: DESCRIPTORS: ACHING

## 2023-06-21 ASSESSMENT — PAIN - FUNCTIONAL ASSESSMENT: PAIN_FUNCTIONAL_ASSESSMENT: 0-10

## 2023-06-21 ASSESSMENT — PAIN DESCRIPTION - LOCATION: LOCATION: HEAD

## 2023-06-22 VITALS
BODY MASS INDEX: 28.12 KG/M2 | DIASTOLIC BLOOD PRESSURE: 73 MMHG | WEIGHT: 175 LBS | OXYGEN SATURATION: 95 % | HEART RATE: 56 BPM | SYSTOLIC BLOOD PRESSURE: 130 MMHG | TEMPERATURE: 98.4 F | HEIGHT: 66 IN | RESPIRATION RATE: 19 BRPM

## 2023-06-22 LAB
ALBUMIN SERPL-MCNC: 3.3 G/DL (ref 3.2–4.6)
ALBUMIN/GLOB SERPL: 0.8 (ref 0.4–1.6)
ALP SERPL-CCNC: 83 U/L (ref 50–136)
ALT SERPL-CCNC: 30 U/L (ref 12–65)
ANION GAP SERPL CALC-SCNC: 4 MMOL/L (ref 2–11)
AST SERPL-CCNC: 23 U/L (ref 15–37)
BILIRUB SERPL-MCNC: 0.2 MG/DL (ref 0.2–1.1)
BUN SERPL-MCNC: 11 MG/DL (ref 8–23)
CALCIUM SERPL-MCNC: 8.8 MG/DL (ref 8.3–10.4)
CHLORIDE SERPL-SCNC: 111 MMOL/L (ref 101–110)
CO2 SERPL-SCNC: 25 MMOL/L (ref 21–32)
CREAT SERPL-MCNC: 0.9 MG/DL (ref 0.6–1)
EKG ATRIAL RATE: 82 BPM
EKG DIAGNOSIS: NORMAL
EKG P AXIS: 67 DEGREES
EKG P-R INTERVAL: 186 MS
EKG Q-T INTERVAL: 388 MS
EKG QRS DURATION: 80 MS
EKG QTC CALCULATION (BAZETT): 453 MS
EKG R AXIS: 65 DEGREES
EKG T AXIS: 44 DEGREES
EKG VENTRICULAR RATE: 82 BPM
GLOBULIN SER CALC-MCNC: 4 G/DL (ref 2.8–4.5)
GLUCOSE SERPL-MCNC: 111 MG/DL (ref 65–100)
MAGNESIUM SERPL-MCNC: 1.7 MG/DL (ref 1.8–2.4)
POTASSIUM SERPL-SCNC: 3.5 MMOL/L (ref 3.5–5.1)
PROT SERPL-MCNC: 7.3 G/DL (ref 6.3–8.2)
SODIUM SERPL-SCNC: 140 MMOL/L (ref 133–143)
T3 SERPL-MCNC: 1 NG/ML (ref 0.6–1.81)
T4 FREE SERPL-MCNC: 1.4 NG/DL (ref 0.78–1.46)
TROPONIN I BLD-MCNC: 0 NG/ML (ref 0.02–0.05)
TROPONIN I SERPL HS-MCNC: 3.1 PG/ML (ref 0–37)
TSH W FREE THYROID IF ABNORMAL: 0.09 UIU/ML (ref 0.36–3.74)

## 2023-06-22 PROCEDURE — 96365 THER/PROPH/DIAG IV INF INIT: CPT

## 2023-06-22 PROCEDURE — 84484 ASSAY OF TROPONIN QUANT: CPT

## 2023-06-22 PROCEDURE — 6360000002 HC RX W HCPCS: Performed by: EMERGENCY MEDICINE

## 2023-06-22 PROCEDURE — 93010 ELECTROCARDIOGRAM REPORT: CPT | Performed by: INTERNAL MEDICINE

## 2023-06-22 RX ORDER — MAGNESIUM SULFATE 1 G/100ML
1000 INJECTION INTRAVENOUS ONCE
Status: COMPLETED | OUTPATIENT
Start: 2023-06-22 | End: 2023-06-22

## 2023-06-22 RX ADMIN — MAGNESIUM SULFATE HEPTAHYDRATE 1000 MG: 1 INJECTION, SOLUTION INTRAVENOUS at 00:56

## 2023-06-22 ASSESSMENT — LIFESTYLE VARIABLES
HOW MANY STANDARD DRINKS CONTAINING ALCOHOL DO YOU HAVE ON A TYPICAL DAY: PATIENT DOES NOT DRINK
HOW OFTEN DO YOU HAVE A DRINK CONTAINING ALCOHOL: NEVER

## 2023-06-22 ASSESSMENT — PAIN - FUNCTIONAL ASSESSMENT: PAIN_FUNCTIONAL_ASSESSMENT: 0-10

## 2023-06-22 ASSESSMENT — PAIN SCALES - GENERAL: PAINLEVEL_OUTOF10: 0

## 2023-06-22 NOTE — ED PROVIDER NOTES
morphology is normal.       Impression    No acute cardiopulmonary abnormality.      Kelli Carrillo M.D.   6/21/2023 11:54:00 PM   TSH with Reflex   Result Value Ref Range    TSH w Free Thyroid if Abnormal 0.09 (L) 0.358 - 3.740 UIU/ML   CBC   Result Value Ref Range    WBC 6.7 4.3 - 11.1 K/uL    RBC 4.53 4.05 - 5.2 M/uL    Hemoglobin 13.3 11.7 - 15.4 g/dL    Hematocrit 39.0 35.8 - 46.3 %    MCV 86.1 82 - 102 FL    MCH 29.4 26.1 - 32.9 PG    MCHC 34.1 31.4 - 35.0 g/dL    RDW 12.7 11.9 - 14.6 %    Platelets 567 402 - 390 K/uL    MPV 10.1 9.4 - 12.3 FL    nRBC 0.00 0.0 - 0.2 K/uL   Comprehensive Metabolic Panel   Result Value Ref Range    Sodium 140 133 - 143 mmol/L    Potassium 3.5 3.5 - 5.1 mmol/L    Chloride 111 (H) 101 - 110 mmol/L    CO2 25 21 - 32 mmol/L    Anion Gap 4 2 - 11 mmol/L    Glucose 111 (H) 65 - 100 mg/dL    BUN 11 8 - 23 MG/DL    Creatinine 0.90 0.6 - 1.0 MG/DL    Est, Glom Filt Rate >60 >60 ml/min/1.73m2    Calcium 8.8 8.3 - 10.4 MG/DL    Total Bilirubin 0.2 0.2 - 1.1 MG/DL    ALT 30 12 - 65 U/L    AST 23 15 - 37 U/L    Alk Phosphatase 83 50 - 136 U/L    Total Protein 7.3 6.3 - 8.2 g/dL    Albumin 3.3 3.2 - 4.6 g/dL    Globulin 4.0 2.8 - 4.5 g/dL    Albumin/Globulin Ratio 0.8 0.4 - 1.6     Troponin   Result Value Ref Range    Troponin, High Sensitivity 3.1 0 - 37 pg/mL   Magnesium   Result Value Ref Range    Magnesium 1.7 (L) 1.8 - 2.4 mg/dL   POCT troponin   Result Value Ref Range    POC Troponin I 0 (L) 0.02 - 0.05 ng/ml   EKG 12 Lead   Result Value Ref Range    Ventricular Rate 82 BPM    Atrial Rate 82 BPM    P-R Interval 186 ms    QRS Duration 80 ms    Q-T Interval 388 ms    QTc Calculation (Bazett) 453 ms    P Axis 67 degrees    R Axis 65 degrees    T Axis 44 degrees    Diagnosis       Normal sinus rhythm  Normal ECG  When compared with ECG of 12-JAN-2022 10:32,  Minimal criteria for Anterior infarct are no longer Present          XR CHEST (2 VW)   Final Result   No acute cardiopulmonary

## 2023-06-22 NOTE — CONSULTS
Session ID: [id number]  Request ID: [id number]  Language: Elizabeth Toney  Status: Fulfilled   ID: #[id number]   Name: Noemi Lakhani

## 2023-06-22 NOTE — ED TRIAGE NOTES
Patient arrives from home with dizziness and heart palpitations. Reports this is worsening over the past week or so. Worse with exertion, difficulty with any physical activity. Reports history of arrhythmia. Reports chronic headaches. Reports tightness in chest and neck.

## 2023-06-22 NOTE — DISCHARGE INSTRUCTIONS
Start taking a calcium magnesium supplement once daily until you see the cardiologist.  If you do not hear from the cardiology office by 1:00 this afternoon call their office to schedule an outpatient follow-up. Continue to monitor your symptoms and if you start developing new concerning symptoms please return immediately to the emergency department. ??????? ????????? ??????? ??????? ? ?????? ???? ??? ? ????, ???? ?? ?????????? ? ??????????. ???? ?? ?? ???????? ???????? ?? ????????? ??????????? ? 13:00 ???????????? ???, ????????? ? ????????? ???????????, ????? ????????? ???????????? ??????????.    ??????????? ??????? ?? ?????? ??????????, ? ???? ? ??? ???????? ????? ????????? ????????, ?????????? ?????????? ? ????????? ?????????? ??????. Devon metzger doolegario holden'tsiya i magniya johny hyde'elizabeth obratites' k kardiologu. Mia carmen ne poluchite izvestiye iz otdeleniya kardiologii k 13:00 erick chan v otdeleniye kardiologii, lona wilson' ambulatornoye nablyudeniye. Prodolzhayte sledit' za ethanoimi simptomami, i yeskiana warner trevolo simptomy, nemedlenno obratites' nate otveena santos.

## 2023-06-22 NOTE — ED NOTES
I have reviewed discharge instructions with the patient. The patient verbalized understanding. Patient left ED via Discharge Method: ambulatory to Home with spouse    Opportunity for questions and clarification provided. Patient given 0 scripts. To continue your aftercare when you leave the hospital, you may receive an automated call from our care team to check in on how you are doing. This is a free service and part of our promise to provide the best care and service to meet your aftercare needs.  If you have questions, or wish to unsubscribe from this service please call [phone]. Thank you for Choosing our [facility] Emergency Department.         Maricruz CarrilloRoxbury Treatment Center  06/22/23 5711

## 2023-06-23 ENCOUNTER — OFFICE VISIT (OUTPATIENT)
Age: 73
End: 2023-06-23
Payer: MEDICARE

## 2023-06-23 VITALS
BODY MASS INDEX: 28.61 KG/M2 | HEIGHT: 66 IN | SYSTOLIC BLOOD PRESSURE: 110 MMHG | HEART RATE: 72 BPM | WEIGHT: 178 LBS | DIASTOLIC BLOOD PRESSURE: 70 MMHG

## 2023-06-23 DIAGNOSIS — R07.2 PRECORDIAL PAIN: ICD-10-CM

## 2023-06-23 DIAGNOSIS — E78.5 DYSLIPIDEMIA: ICD-10-CM

## 2023-06-23 DIAGNOSIS — R00.2 PALPITATIONS: Primary | ICD-10-CM

## 2023-06-23 PROCEDURE — G8400 PT W/DXA NO RESULTS DOC: HCPCS | Performed by: INTERNAL MEDICINE

## 2023-06-23 PROCEDURE — 3078F DIAST BP <80 MM HG: CPT | Performed by: INTERNAL MEDICINE

## 2023-06-23 PROCEDURE — 1123F ACP DISCUSS/DSCN MKR DOCD: CPT | Performed by: INTERNAL MEDICINE

## 2023-06-23 PROCEDURE — 99204 OFFICE O/P NEW MOD 45 MIN: CPT | Performed by: INTERNAL MEDICINE

## 2023-06-23 PROCEDURE — G8428 CUR MEDS NOT DOCUMENT: HCPCS | Performed by: INTERNAL MEDICINE

## 2023-06-23 PROCEDURE — G8417 CALC BMI ABV UP PARAM F/U: HCPCS | Performed by: INTERNAL MEDICINE

## 2023-06-23 PROCEDURE — 3017F COLORECTAL CA SCREEN DOC REV: CPT | Performed by: INTERNAL MEDICINE

## 2023-06-23 PROCEDURE — 3074F SYST BP LT 130 MM HG: CPT | Performed by: INTERNAL MEDICINE

## 2023-06-23 PROCEDURE — 1090F PRES/ABSN URINE INCON ASSESS: CPT | Performed by: INTERNAL MEDICINE

## 2023-06-23 PROCEDURE — 1036F TOBACCO NON-USER: CPT | Performed by: INTERNAL MEDICINE

## 2023-06-23 RX ORDER — EZETIMIBE 10 MG/1
10 TABLET ORAL DAILY
COMMUNITY
Start: 2023-06-13

## 2023-06-23 RX ORDER — ZOLPIDEM TARTRATE 10 MG/1
TABLET ORAL
COMMUNITY
Start: 2023-06-01

## 2023-06-23 NOTE — PROGRESS NOTES
[address], [address] [facility], [address] [place] [address]  PHONE: [phone]    SUBJECTIVE: /HPI  Rita Luan (1950) is a 68 y.o. female seen for a follow up visit regarding the following:   Specialty Problems          Cardiology Problems    Hyperlipidemia        Hypertension        Atypical chest pain         Patient referred for palpitations. She had a palpitation work-up 1 year ago which included a nuclear stress test review of prior echo CTA of chest negative for PE this was secondary to an elevated D-dimer she had laboratory work she had a 3-day monitor showing rare supraventricular ectopy. She has had lab work that is within the normal range. She had referrals to pulmonary medicine and endocrinology for incidental nodules noted in the thyroid and lungs. She is referred back for apparently her history of supraventricular ectopy/PACs    Past Medical History, Past Surgical History, Family history, Social History, and Medications were all reviewed with the patient today and updated as necessary.     No Known Allergies  Past Medical History:   Diagnosis Date    Hypercholesterolemia      Past Surgical History:   Procedure Laterality Date    APPENDECTOMY  1979    HEMORRHOID SURGERY      OTHER SURGICAL HISTORY Right     Resection of Ovarian Cyst     US LYMPH NODE BIOPSY  9/29/2022    US LYMPH NODE BIOPSY 9/29/2022 Hyland Burkitt, PA-C [facility] US     Family History   Problem Relation Age of Onset    Hypertension Mother     Dementia Mother     Thyroid Disease Mother         Hyperthyroidism    Heart Disease Father     Elevated Lipids Father     Stroke Father     Breast Cancer Maternal Grandmother 61      Social History     Tobacco Use    Smoking status: Never    Smokeless tobacco: Never   Substance Use Topics    Alcohol use: Never       Current Outpatient Medications:     EUTHYROX 88 MCG tablet, 1 tablet once a day with an extra 1/2 tablet on Sundays, Disp: 100 tablet, Rfl: 3 Purse String (Intermediate) Text: Given the location of the defect and the characteristics of the surrounding skin a purse string intermediate closure was deemed most appropriate.  Undermining was performed circumfirentially around the surgical defect.  A purse string suture was then placed and tightened.

## 2023-08-16 DIAGNOSIS — E89.0 POSTSURGICAL HYPOTHYROIDISM: ICD-10-CM

## 2023-08-16 DIAGNOSIS — C73 THYROID CANCER (HCC): ICD-10-CM

## 2023-08-16 LAB — TSH W FREE THYROID IF ABNORMAL: 0.08 UIU/ML (ref 0.36–3.74)

## 2023-08-17 LAB
T3 SERPL-MCNC: 0.94 NG/ML (ref 0.6–1.81)
T4 FREE SERPL-MCNC: 1.4 NG/DL (ref 0.78–1.46)
THYROGLOB AB SERPL-ACNC: 195.8 IU/ML (ref 0–0.9)

## 2023-08-25 LAB — THYROGLOBULIN: 5.1 NG/ML

## 2023-09-01 ENCOUNTER — TRANSCRIBE ORDERS (OUTPATIENT)
Dept: SCHEDULING | Age: 73
End: 2023-09-01

## 2023-09-01 ENCOUNTER — OFFICE VISIT (OUTPATIENT)
Dept: ENDOCRINOLOGY | Age: 73
End: 2023-09-01
Payer: MEDICARE

## 2023-09-01 VITALS — SYSTOLIC BLOOD PRESSURE: 125 MMHG | BODY MASS INDEX: 29.11 KG/M2 | DIASTOLIC BLOOD PRESSURE: 80 MMHG | WEIGHT: 179 LBS

## 2023-09-01 DIAGNOSIS — E89.0 POSTSURGICAL HYPOTHYROIDISM: ICD-10-CM

## 2023-09-01 DIAGNOSIS — R92.8 ABNORMAL MAMMOGRAM: Primary | ICD-10-CM

## 2023-09-01 DIAGNOSIS — R91.8 PULMONARY NODULES: ICD-10-CM

## 2023-09-01 DIAGNOSIS — C73 THYROID CANCER (HCC): Primary | ICD-10-CM

## 2023-09-01 PROCEDURE — 1090F PRES/ABSN URINE INCON ASSESS: CPT | Performed by: INTERNAL MEDICINE

## 2023-09-01 PROCEDURE — 3017F COLORECTAL CA SCREEN DOC REV: CPT | Performed by: INTERNAL MEDICINE

## 2023-09-01 PROCEDURE — 3074F SYST BP LT 130 MM HG: CPT | Performed by: INTERNAL MEDICINE

## 2023-09-01 PROCEDURE — 1123F ACP DISCUSS/DSCN MKR DOCD: CPT | Performed by: INTERNAL MEDICINE

## 2023-09-01 PROCEDURE — G8427 DOCREV CUR MEDS BY ELIG CLIN: HCPCS | Performed by: INTERNAL MEDICINE

## 2023-09-01 PROCEDURE — G8400 PT W/DXA NO RESULTS DOC: HCPCS | Performed by: INTERNAL MEDICINE

## 2023-09-01 PROCEDURE — 99214 OFFICE O/P EST MOD 30 MIN: CPT | Performed by: INTERNAL MEDICINE

## 2023-09-01 PROCEDURE — G8417 CALC BMI ABV UP PARAM F/U: HCPCS | Performed by: INTERNAL MEDICINE

## 2023-09-01 PROCEDURE — 1036F TOBACCO NON-USER: CPT | Performed by: INTERNAL MEDICINE

## 2023-09-01 PROCEDURE — 3079F DIAST BP 80-89 MM HG: CPT | Performed by: INTERNAL MEDICINE

## 2023-09-01 RX ORDER — THYROTROPIN ALFA 0.9 MG/ML
INJECTION, POWDER, FOR SOLUTION INTRAMUSCULAR
Qty: 2 EACH | Refills: 0 | Status: SHIPPED | OUTPATIENT
Start: 2023-09-01

## 2023-09-01 RX ORDER — LEVOTHYROXINE SODIUM 88 UG/1
88 TABLET ORAL DAILY
Qty: 90 TABLET | Refills: 3 | Status: SHIPPED | OUTPATIENT
Start: 2023-09-01

## 2023-09-01 ASSESSMENT — ENCOUNTER SYMPTOMS
CONSTIPATION: 0
ROS SKIN COMMENTS: DENIES HAIR LOSS, DRY SKIN.
COUGH: 1
DIARRHEA: 0
SHORTNESS OF BREATH: 1

## 2023-09-01 NOTE — PROGRESS NOTES
malignant cells identified, Tg washout <4.0. Posttreatment whole-body scan 11/16/2022: Expected radioiodine uptake in the thyroid bed. No other focus of accumulation is seen to suggest metastatic disease. CT chest with contrast 3/14/2023: 3 mm nodule in the superior segment of the left lower lobe, stable. Pulmonary nodule in the posterior right lower lobe has increased in size slightly. There is a 3 mm pleural-based pulmonary nodule in the posterior left lower lobe which appears new. Neck ultrasound 3/14/2023: Right level 2 lymph node measuring 12 x 5 mm. Left level 2 lymph node measuring 8 x 4 mm. Right level 3 lymph nodes measuring 22 x 8 mm and 21 x 8 mm. Left level 3 lymph nodes measuring 16 x 5 mm and 15 x 6 mm. Level 4 lymph nodes measuring 8 x 7 mm and 11 x 5 mm. The lymph nodes are similar to the prior examination. Labs:  4/28/2022: TSH 0.497.  7/2/2022: TSH 0.015, free T4 2.67.  7/18/2022: TSH 0.033, free T4 1.94.  8/19/2022: TSH 0.022, free T4 1.4, Tg 7.9, Tg Ab 630.4 (on Euthyrox 100 mcg daily). 11/2/2022: TSH 3.24, Tg-TESSA 5.4, Tg Ab 472.8 (on Euthyrox 75 mcg daily). 2/8/2023: TSH 0.69, Tg-TESSA 9.7, Tg Ab 624.4.  4/18/2023: TSH 0.18, free T4 1.3, total T3 1.17, Tg-TESSA 5.2, Tg Ab 373.3.  6/21/2023: TSH 0.09, free T4 1.4, total T3 1.00.  8/16/2023: TSH 0.08, free T4 1.4, total T3 0.94, Tg-TESSA 5.1, Tg Ab 195.8. THYROID DISEASE    Presentation/Diagnosis: Postsurgical hypothyroidism. Symptoms: See review of systems. Treatment: Takes name brand in AM correctly. Review of Systems   Constitutional:  Positive for fatigue. Negative for diaphoresis. Weight decreased 2 pounds since last visit. Respiratory:  Positive for cough (chronic and dry) and shortness of breath (SWANSON). Cardiovascular:  Positive for chest pain (occasional chest pressure) and palpitations (PACs improved; her verapamil was increased in 6/2023; followed by cardiology).    Gastrointestinal:  Negative for

## 2023-09-05 ENCOUNTER — HOSPITAL ENCOUNTER (OUTPATIENT)
Dept: CT IMAGING | Age: 73
Discharge: HOME | End: 2023-09-08
Attending: INTERNAL MEDICINE
Payer: MEDICARE

## 2023-09-05 DIAGNOSIS — C73 THYROID CANCER (HCC): ICD-10-CM

## 2023-09-05 DIAGNOSIS — R91.8 PULMONARY NODULES: ICD-10-CM

## 2023-09-05 PROCEDURE — 71250 CT THORAX DX C-: CPT

## 2023-09-08 ENCOUNTER — PATIENT MESSAGE (OUTPATIENT)
Dept: ENDOCRINOLOGY | Age: 73
End: 2023-09-08

## 2023-09-08 DIAGNOSIS — E89.0 POSTSURGICAL HYPOTHYROIDISM: ICD-10-CM

## 2023-09-08 RX ORDER — LEVOTHYROXINE SODIUM 88 UG/1
88 TABLET ORAL DAILY
Qty: 90 TABLET | Refills: 3 | Status: SHIPPED | OUTPATIENT
Start: 2023-09-08

## 2023-09-08 NOTE — TELEPHONE ENCOUNTER
From: Cesar Gutierres  To: Dr. Santoro Re: 9/8/2023 1:37 PM EDT  Subject: Update pharmacy    Hello Doctor,    Please update my pharmacy for Euthyrox 88 MCG tablet   from   [facility], [address] [place] [address] [phone]   to   [address], [address] [facility], [address] [place] [address]. Thank you very much,  Cesar Gutierres.

## 2023-09-11 ENCOUNTER — PATIENT MESSAGE (OUTPATIENT)
Dept: ENDOCRINOLOGY | Age: 73
End: 2023-09-11

## 2023-09-18 ENCOUNTER — TELEPHONE (OUTPATIENT)
Dept: ENDOCRINOLOGY | Age: 73
End: 2023-09-18

## 2023-09-19 NOTE — TELEPHONE ENCOUNTER
Spoke with patient who would like to start her low iodine diet on 10/1/23. Tenative schedule as below. 10/1/23-start diet  10/16/23-thyrogen  10/17/23-thyrogen  10/18/23-MUHAMMAD  10/25/23-WBS    Fletcher Jones will message nuclear medicine to see if dates are available.

## 2023-09-20 NOTE — TELEPHONE ENCOUNTER
A correction has been made on the tentative schedule. Below is the correct schedule. Patient has been sent a different [employer] message with correction. Tenative schedule as below.    10/1/23-start diet  10/16/23-lab work then thyrogen injection  10/17/23-thyrogen injection  10/18/23-Radioactive pill  10/19/23- whole body scan  10/20/23-lab work then second whole body scan

## 2023-10-02 ENCOUNTER — HOSPITAL ENCOUNTER (OUTPATIENT)
Dept: MAMMOGRAPHY | Age: 73
Discharge: HOME | End: 2023-10-05
Payer: MEDICARE

## 2023-10-02 VITALS — WEIGHT: 146 LBS | BODY MASS INDEX: 23.46 KG/M2 | HEIGHT: 66 IN

## 2023-10-02 DIAGNOSIS — R92.8 ABNORMAL FINDING ON MAMMOGRAPHY: ICD-10-CM

## 2023-10-02 DIAGNOSIS — R92.8 ABNORMAL MAMMOGRAM: ICD-10-CM

## 2023-10-02 PROCEDURE — 77065 DX MAMMO INCL CAD UNI: CPT

## 2023-10-02 PROCEDURE — 76882 US LMTD JT/FCL EVL NVASC XTR: CPT

## 2023-10-16 ENCOUNTER — HOSPITAL ENCOUNTER (OUTPATIENT)
Dept: INFUSION THERAPY | Age: 73
Discharge: HOME | End: 2023-10-16
Payer: MEDICARE

## 2023-10-16 VITALS
RESPIRATION RATE: 18 BRPM | TEMPERATURE: 97.7 F | WEIGHT: 177.2 LBS | OXYGEN SATURATION: 97 % | DIASTOLIC BLOOD PRESSURE: 74 MMHG | SYSTOLIC BLOOD PRESSURE: 124 MMHG | HEART RATE: 65 BPM | BODY MASS INDEX: 28.6 KG/M2

## 2023-10-16 DIAGNOSIS — C73 THYROID CANCER (HCC): Primary | ICD-10-CM

## 2023-10-16 PROCEDURE — 96372 THER/PROPH/DIAG INJ SC/IM: CPT

## 2023-10-16 PROCEDURE — 2580000003 HC RX 258: Performed by: INTERNAL MEDICINE

## 2023-10-16 PROCEDURE — 6360000002 HC RX W HCPCS: Performed by: INTERNAL MEDICINE

## 2023-10-16 RX ORDER — ALBUTEROL SULFATE 90 UG/1
4 AEROSOL, METERED RESPIRATORY (INHALATION) PRN
Status: CANCELLED | OUTPATIENT
Start: 2023-10-17

## 2023-10-16 RX ORDER — DIPHENHYDRAMINE HYDROCHLORIDE 50 MG/ML
50 INJECTION INTRAMUSCULAR; INTRAVENOUS
Status: DISCONTINUED | OUTPATIENT
Start: 2023-10-16 | End: 2023-10-17 | Stop reason: HOSPADM

## 2023-10-16 RX ORDER — EPINEPHRINE 1 MG/ML
0.3 INJECTION, SOLUTION, CONCENTRATE INTRAVENOUS PRN
Status: CANCELLED | OUTPATIENT
Start: 2023-10-17

## 2023-10-16 RX ORDER — SODIUM CHLORIDE 9 MG/ML
INJECTION, SOLUTION INTRAVENOUS CONTINUOUS
Status: DISCONTINUED | OUTPATIENT
Start: 2023-10-16 | End: 2023-10-17 | Stop reason: HOSPADM

## 2023-10-16 RX ORDER — SODIUM CHLORIDE 9 MG/ML
INJECTION, SOLUTION INTRAVENOUS CONTINUOUS
Status: CANCELLED | OUTPATIENT
Start: 2023-10-17

## 2023-10-16 RX ORDER — DIPHENHYDRAMINE HYDROCHLORIDE 50 MG/ML
50 INJECTION INTRAMUSCULAR; INTRAVENOUS
Status: CANCELLED | OUTPATIENT
Start: 2023-10-17

## 2023-10-16 RX ORDER — ACETAMINOPHEN 325 MG/1
650 TABLET ORAL
Status: CANCELLED | OUTPATIENT
Start: 2023-10-17

## 2023-10-16 RX ORDER — ONDANSETRON 2 MG/ML
8 INJECTION INTRAMUSCULAR; INTRAVENOUS
Status: CANCELLED | OUTPATIENT
Start: 2023-10-17

## 2023-10-16 RX ORDER — ACETAMINOPHEN 325 MG/1
650 TABLET ORAL
Status: DISCONTINUED | OUTPATIENT
Start: 2023-10-16 | End: 2023-10-17 | Stop reason: HOSPADM

## 2023-10-16 RX ORDER — ALBUTEROL SULFATE 90 UG/1
4 AEROSOL, METERED RESPIRATORY (INHALATION) PRN
Status: DISCONTINUED | OUTPATIENT
Start: 2023-10-16 | End: 2023-10-17 | Stop reason: HOSPADM

## 2023-10-16 RX ORDER — EPINEPHRINE 1 MG/ML
0.3 INJECTION, SOLUTION, CONCENTRATE INTRAVENOUS PRN
Status: DISCONTINUED | OUTPATIENT
Start: 2023-10-16 | End: 2023-10-17 | Stop reason: HOSPADM

## 2023-10-16 RX ORDER — ONDANSETRON 2 MG/ML
8 INJECTION INTRAMUSCULAR; INTRAVENOUS
Status: DISCONTINUED | OUTPATIENT
Start: 2023-10-16 | End: 2023-10-17 | Stop reason: HOSPADM

## 2023-10-16 RX ADMIN — WATER 0.9 MG: 1 INJECTION INTRAMUSCULAR; INTRAVENOUS; SUBCUTANEOUS at 08:17

## 2023-10-16 NOTE — PROGRESS NOTES
Pt  arrived to infusion center to resume thyrogen injections. Pt tolerated well. Pt aware of next injection tomorrow at 8am.  Pt discharged ambulatory.

## 2023-10-17 ENCOUNTER — HOSPITAL ENCOUNTER (OUTPATIENT)
Dept: INFUSION THERAPY | Age: 73
Discharge: HOME | End: 2023-10-17
Payer: MEDICARE

## 2023-10-17 VITALS
RESPIRATION RATE: 18 BRPM | SYSTOLIC BLOOD PRESSURE: 129 MMHG | BODY MASS INDEX: 28.89 KG/M2 | OXYGEN SATURATION: 96 % | HEART RATE: 70 BPM | WEIGHT: 179 LBS | TEMPERATURE: 98.5 F | DIASTOLIC BLOOD PRESSURE: 78 MMHG

## 2023-10-17 DIAGNOSIS — C73 THYROID CANCER (HCC): Primary | ICD-10-CM

## 2023-10-17 PROCEDURE — 6360000002 HC RX W HCPCS: Performed by: INTERNAL MEDICINE

## 2023-10-17 PROCEDURE — 2580000003 HC RX 258: Performed by: INTERNAL MEDICINE

## 2023-10-17 PROCEDURE — 96372 THER/PROPH/DIAG INJ SC/IM: CPT

## 2023-10-17 RX ORDER — SODIUM CHLORIDE 9 MG/ML
INJECTION, SOLUTION INTRAVENOUS CONTINUOUS
Status: DISCONTINUED | OUTPATIENT
Start: 2023-10-17 | End: 2023-10-18 | Stop reason: HOSPADM

## 2023-10-17 RX ORDER — ALBUTEROL SULFATE 90 UG/1
4 AEROSOL, METERED RESPIRATORY (INHALATION) PRN
Status: DISCONTINUED | OUTPATIENT
Start: 2023-10-17 | End: 2023-10-18 | Stop reason: HOSPADM

## 2023-10-17 RX ORDER — ALBUTEROL SULFATE 90 UG/1
4 AEROSOL, METERED RESPIRATORY (INHALATION) PRN
OUTPATIENT
Start: 2023-10-17

## 2023-10-17 RX ORDER — SODIUM CHLORIDE 9 MG/ML
INJECTION, SOLUTION INTRAVENOUS CONTINUOUS
OUTPATIENT
Start: 2023-10-17

## 2023-10-17 RX ORDER — ACETAMINOPHEN 325 MG/1
650 TABLET ORAL
OUTPATIENT
Start: 2023-10-17

## 2023-10-17 RX ORDER — EPINEPHRINE 1 MG/ML
0.3 INJECTION, SOLUTION, CONCENTRATE INTRAVENOUS PRN
Status: DISCONTINUED | OUTPATIENT
Start: 2023-10-17 | End: 2023-10-18 | Stop reason: HOSPADM

## 2023-10-17 RX ORDER — ACETAMINOPHEN 325 MG/1
650 TABLET ORAL
Status: DISCONTINUED | OUTPATIENT
Start: 2023-10-17 | End: 2023-10-18 | Stop reason: HOSPADM

## 2023-10-17 RX ORDER — ONDANSETRON 2 MG/ML
8 INJECTION INTRAMUSCULAR; INTRAVENOUS
Status: DISCONTINUED | OUTPATIENT
Start: 2023-10-17 | End: 2023-10-18 | Stop reason: HOSPADM

## 2023-10-17 RX ORDER — DIPHENHYDRAMINE HYDROCHLORIDE 50 MG/ML
50 INJECTION INTRAMUSCULAR; INTRAVENOUS
OUTPATIENT
Start: 2023-10-17

## 2023-10-17 RX ORDER — ONDANSETRON 2 MG/ML
8 INJECTION INTRAMUSCULAR; INTRAVENOUS
OUTPATIENT
Start: 2023-10-17

## 2023-10-17 RX ORDER — EPINEPHRINE 1 MG/ML
0.3 INJECTION, SOLUTION, CONCENTRATE INTRAVENOUS PRN
OUTPATIENT
Start: 2023-10-17

## 2023-10-17 RX ORDER — DIPHENHYDRAMINE HYDROCHLORIDE 50 MG/ML
50 INJECTION INTRAMUSCULAR; INTRAVENOUS
Status: DISCONTINUED | OUTPATIENT
Start: 2023-10-17 | End: 2023-10-18 | Stop reason: HOSPADM

## 2023-10-17 RX ADMIN — WATER 0.9 MG: 1 INJECTION INTRAMUSCULAR; INTRAVENOUS; SUBCUTANEOUS at 08:26

## 2023-10-17 NOTE — PROGRESS NOTES
Pt arrived to infusion center. Thyrogen injection completed. Pt tolerated well. Pt discharged ambulatory.

## 2023-10-18 ENCOUNTER — HOSPITAL ENCOUNTER (OUTPATIENT)
Dept: NUCLEAR MEDICINE | Age: 73
Discharge: HOME | End: 2023-10-21
Payer: MEDICARE

## 2023-10-18 DIAGNOSIS — C73 THYROID CANCER (HCC): ICD-10-CM

## 2023-10-18 PROCEDURE — A9517 I131 IODIDE CAP, RX: HCPCS | Performed by: INTERNAL MEDICINE

## 2023-10-18 PROCEDURE — 3430000000 HC RX DIAGNOSTIC RADIOPHARMACEUTICAL: Performed by: INTERNAL MEDICINE

## 2023-10-18 PROCEDURE — 78018 THYROID MET IMAGING BODY: CPT

## 2023-10-18 RX ADMIN — SODIUM IODIDE I 131 5.6 MILLICURIE: 1 CAPSULE, GELATIN COATED ORAL at 11:30

## 2023-10-19 ENCOUNTER — HOSPITAL ENCOUNTER (OUTPATIENT)
Dept: NUCLEAR MEDICINE | Age: 73
Discharge: HOME | End: 2023-10-22

## 2023-10-20 ENCOUNTER — PATIENT MESSAGE (OUTPATIENT)
Dept: ENDOCRINOLOGY | Age: 73
End: 2023-10-20

## 2023-10-20 ENCOUNTER — HOSPITAL ENCOUNTER (OUTPATIENT)
Dept: NUCLEAR MEDICINE | Age: 73
End: 2023-10-20
Payer: MEDICARE

## 2023-10-20 DIAGNOSIS — R91.8 PULMONARY NODULES: ICD-10-CM

## 2023-10-20 DIAGNOSIS — C73 THYROID CANCER (HCC): Primary | ICD-10-CM

## 2023-10-20 DIAGNOSIS — R93.2 ABNORMAL FINDINGS ON DIAGNOSTIC IMAGING OF LIVER AND BILIARY TRACT: ICD-10-CM

## 2023-10-20 DIAGNOSIS — C73 THYROID CANCER (HCC): ICD-10-CM

## 2023-10-20 PROCEDURE — 78830 RP LOCLZJ TUM SPECT W/CT 1: CPT

## 2023-10-27 ENCOUNTER — HOSPITAL ENCOUNTER (OUTPATIENT)
Dept: CT IMAGING | Age: 73
End: 2023-10-27
Attending: INTERNAL MEDICINE
Payer: MEDICARE

## 2023-10-27 DIAGNOSIS — R93.2 ABNORMAL FINDINGS ON DIAGNOSTIC IMAGING OF LIVER AND BILIARY TRACT: ICD-10-CM

## 2023-10-27 DIAGNOSIS — R91.8 PULMONARY NODULES: ICD-10-CM

## 2023-10-27 DIAGNOSIS — C73 THYROID CANCER (HCC): ICD-10-CM

## 2023-10-27 LAB — CREAT BLD-MCNC: 0.71 MG/DL (ref 0.8–1.5)

## 2023-10-27 PROCEDURE — 71260 CT THORAX DX C+: CPT

## 2023-10-27 PROCEDURE — 6360000004 HC RX CONTRAST MEDICATION: Performed by: INTERNAL MEDICINE

## 2023-10-27 PROCEDURE — 82565 ASSAY OF CREATININE: CPT

## 2023-10-27 RX ORDER — SODIUM CHLORIDE 0.9 % (FLUSH) 0.9 %
10 SYRINGE (ML) INJECTION
Status: DISCONTINUED | OUTPATIENT
Start: 2023-10-27 | End: 2023-10-31 | Stop reason: HOSPADM

## 2023-10-27 RX ORDER — 0.9 % SODIUM CHLORIDE 0.9 %
100 INTRAVENOUS SOLUTION INTRAVENOUS
Status: DISCONTINUED | OUTPATIENT
Start: 2023-10-27 | End: 2023-10-31 | Stop reason: HOSPADM

## 2023-10-27 RX ADMIN — IOPAMIDOL 100 ML: 755 INJECTION, SOLUTION INTRAVENOUS at 10:45

## 2023-11-16 ENCOUNTER — TELEPHONE (OUTPATIENT)
Dept: ENDOCRINOLOGY | Age: 73
End: 2023-11-16

## 2023-11-16 DIAGNOSIS — C73 THYROID CANCER (HCC): ICD-10-CM

## 2023-11-16 DIAGNOSIS — E89.0 POSTSURGICAL HYPOTHYROIDISM: Primary | ICD-10-CM

## 2023-11-29 DIAGNOSIS — C73 THYROID CANCER (HCC): ICD-10-CM

## 2023-11-29 DIAGNOSIS — E89.0 POSTSURGICAL HYPOTHYROIDISM: ICD-10-CM

## 2023-11-30 LAB
T4 FREE SERPL-MCNC: 1.3 NG/DL (ref 0.78–1.46)
TSH W FREE THYROID IF ABNORMAL: 0.22 UIU/ML (ref 0.36–3.74)

## 2023-12-10 LAB
THYROGLOB AB SERPL-ACNC: 159.3 IU/ML (ref 0–0.9)
THYROGLOBULIN: 2.8 NG/ML

## 2023-12-15 ENCOUNTER — OFFICE VISIT (OUTPATIENT)
Dept: ENDOCRINOLOGY | Age: 73
End: 2023-12-15
Payer: MEDICARE

## 2023-12-15 VITALS
SYSTOLIC BLOOD PRESSURE: 120 MMHG | HEART RATE: 78 BPM | BODY MASS INDEX: 29.05 KG/M2 | WEIGHT: 180 LBS | DIASTOLIC BLOOD PRESSURE: 80 MMHG | OXYGEN SATURATION: 98 %

## 2023-12-15 DIAGNOSIS — R91.8 PULMONARY NODULES: ICD-10-CM

## 2023-12-15 DIAGNOSIS — C73 THYROID CANCER (HCC): Primary | ICD-10-CM

## 2023-12-15 DIAGNOSIS — E89.0 POSTSURGICAL HYPOTHYROIDISM: ICD-10-CM

## 2023-12-15 DIAGNOSIS — Z87.19 HISTORY OF GASTRITIS: ICD-10-CM

## 2023-12-15 PROCEDURE — 3079F DIAST BP 80-89 MM HG: CPT | Performed by: INTERNAL MEDICINE

## 2023-12-15 PROCEDURE — 1036F TOBACCO NON-USER: CPT | Performed by: INTERNAL MEDICINE

## 2023-12-15 PROCEDURE — G8400 PT W/DXA NO RESULTS DOC: HCPCS | Performed by: INTERNAL MEDICINE

## 2023-12-15 PROCEDURE — G8484 FLU IMMUNIZE NO ADMIN: HCPCS | Performed by: INTERNAL MEDICINE

## 2023-12-15 PROCEDURE — 1090F PRES/ABSN URINE INCON ASSESS: CPT | Performed by: INTERNAL MEDICINE

## 2023-12-15 PROCEDURE — G8427 DOCREV CUR MEDS BY ELIG CLIN: HCPCS | Performed by: INTERNAL MEDICINE

## 2023-12-15 PROCEDURE — 99214 OFFICE O/P EST MOD 30 MIN: CPT | Performed by: INTERNAL MEDICINE

## 2023-12-15 PROCEDURE — 3017F COLORECTAL CA SCREEN DOC REV: CPT | Performed by: INTERNAL MEDICINE

## 2023-12-15 PROCEDURE — G8417 CALC BMI ABV UP PARAM F/U: HCPCS | Performed by: INTERNAL MEDICINE

## 2023-12-15 PROCEDURE — 1123F ACP DISCUSS/DSCN MKR DOCD: CPT | Performed by: INTERNAL MEDICINE

## 2023-12-15 PROCEDURE — 3074F SYST BP LT 130 MM HG: CPT | Performed by: INTERNAL MEDICINE

## 2023-12-15 RX ORDER — LEVOTHYROXINE SODIUM 88 UG/1
88 TABLET ORAL DAILY
Qty: 90 TABLET | Refills: 3 | Status: SHIPPED | OUTPATIENT
Start: 2023-12-15

## 2023-12-15 NOTE — PROGRESS NOTES
nodules. No mediastinal mass or lymphadenopathy. No anterior mediastinal mass to correspond with focal uptake seen on prior I-131 imaging. Thyroid gland is surgically absent. Similar appearance of 2 prominent right level 3 cervical lymph nodes measuring 1.3 x 0.9 cm and 0.8 x 0.6 cm. Liver contour is normal.  Stable 15 mm right hepatic lobe cyst/hemangioma, unchanged. 2 mm nonobstructing left renal calculus. No correlate lesions are evident with the previously visualized foci of increased I-131 uptake. Labs:  4/28/2022: TSH 0.497.  7/2/2022: TSH 0.015, free T4 2.67.  7/18/2022: TSH 0.033, free T4 1.94.  8/19/2022: TSH 0.022, free T4 1.4, Tg 7.9, Tg Ab 630.4 (on Euthyrox 100 mcg daily). 11/2/2022: TSH 3.24, Tg-TESSA 5.4, Tg Ab 472.8 (on Euthyrox 75 mcg daily). 2/8/2023: TSH 0.69, Tg-TESSA 9.7, Tg Ab 624.4.  4/18/2023: TSH 0.18, free T4 1.3, total T3 1.17, Tg-TESSA 5.2, Tg Ab 373.3.  6/21/2023: TSH 0.09, free T4 1.4, total T3 1.00.  8/16/2023: TSH 0.08, free T4 1.4, total T3 0.94, Tg-TESSA 5.1, Tg Ab 195.8.  10/16/2023: TSH 0.44, Tg-TESSA 3.2,Tg Ab 177.6.  10/20/2023: Thyrogen stimulated Tg 4.2, Tg Ab 178.2.  11/29/2023: TSH 0.22, free T4 1.3, Tg-TESSA 2.8, Tg Ab 159.3      THYROID DISEASE    Presentation/Diagnosis: Postsurgical hypothyroidism. Symptoms: See review of systems. Treatment: Takes name brand in AM correctly. Review of Systems   Constitutional:  Positive for fatigue (mild). Negative for diaphoresis and unexpected weight change. Respiratory:  Positive for cough (chronic and dry). Negative for shortness of breath. Cardiovascular:  Positive for palpitations (worse at night when she lies on her left side; followed by cardiology). Gastrointestinal:  Positive for abdominal pain and nausea. Negative for constipation and diarrhea. Endocrine: Negative for cold intolerance and heat intolerance. Skin:         Denies hair loss, dry skin.    Neurological:  Positive for tremors (chronic

## 2024-03-03 ENCOUNTER — HOSPITAL ENCOUNTER (EMERGENCY)
Age: 74
Discharge: HOME | End: 2024-03-03
Attending: EMERGENCY MEDICINE
Payer: MEDICARE

## 2024-03-03 ENCOUNTER — APPOINTMENT (OUTPATIENT)
Dept: CT IMAGING | Age: 74
End: 2024-03-03
Payer: MEDICARE

## 2024-03-03 VITALS
OXYGEN SATURATION: 98 % | HEART RATE: 100 BPM | HEIGHT: 66 IN | WEIGHT: 176 LBS | DIASTOLIC BLOOD PRESSURE: 91 MMHG | SYSTOLIC BLOOD PRESSURE: 128 MMHG | BODY MASS INDEX: 28.28 KG/M2 | TEMPERATURE: 98.4 F | RESPIRATION RATE: 16 BRPM

## 2024-03-03 DIAGNOSIS — K59.09 OTHER CONSTIPATION: ICD-10-CM

## 2024-03-03 DIAGNOSIS — R16.0 HYPODENSE MASS OF LIVER: ICD-10-CM

## 2024-03-03 DIAGNOSIS — R91.1 LUNG NODULE: Primary | ICD-10-CM

## 2024-03-03 LAB
ALBUMIN SERPL-MCNC: 3.8 G/DL (ref 3.2–4.6)
ALBUMIN/GLOB SERPL: 1 (ref 0.4–1.6)
ALP SERPL-CCNC: 89 U/L (ref 50–136)
ALT SERPL-CCNC: 42 U/L (ref 12–65)
ANION GAP SERPL CALC-SCNC: 7 MMOL/L (ref 2–11)
AST SERPL-CCNC: 34 U/L (ref 15–37)
BASOPHILS # BLD: 0.1 K/UL (ref 0–0.2)
BASOPHILS NFR BLD: 1 % (ref 0–2)
BILIRUB SERPL-MCNC: 0.5 MG/DL (ref 0.2–1.1)
BUN SERPL-MCNC: 9 MG/DL (ref 8–23)
CALCIUM SERPL-MCNC: 10 MG/DL (ref 8.3–10.4)
CHLORIDE SERPL-SCNC: 109 MMOL/L (ref 103–113)
CO2 SERPL-SCNC: 26 MMOL/L (ref 21–32)
CREAT SERPL-MCNC: 0.9 MG/DL (ref 0.6–1)
DIFFERENTIAL METHOD BLD: ABNORMAL
EOSINOPHIL # BLD: 0 K/UL (ref 0–0.8)
EOSINOPHIL NFR BLD: 0 % (ref 0.5–7.8)
ERYTHROCYTE [DISTWIDTH] IN BLOOD BY AUTOMATED COUNT: 12.9 % (ref 11.9–14.6)
GLOBULIN SER CALC-MCNC: 3.8 G/DL (ref 2.8–4.5)
GLUCOSE SERPL-MCNC: 103 MG/DL (ref 65–100)
HCT VFR BLD AUTO: 42.3 % (ref 35.8–46.3)
HGB BLD-MCNC: 14.2 G/DL (ref 11.7–15.4)
IMM GRANULOCYTES # BLD AUTO: 0 K/UL (ref 0–0.5)
IMM GRANULOCYTES NFR BLD AUTO: 0 % (ref 0–5)
LIPASE SERPL-CCNC: 63 U/L (ref 73–393)
LYMPHOCYTES # BLD: 2.1 K/UL (ref 0.5–4.6)
LYMPHOCYTES NFR BLD: 22 % (ref 13–44)
MCH RBC QN AUTO: 28.7 PG (ref 26.1–32.9)
MCHC RBC AUTO-ENTMCNC: 33.6 G/DL (ref 31.4–35)
MCV RBC AUTO: 85.5 FL (ref 82–102)
MONOCYTES # BLD: 0.6 K/UL (ref 0.1–1.3)
MONOCYTES NFR BLD: 7 % (ref 4–12)
NEUTS SEG # BLD: 6.7 K/UL (ref 1.7–8.2)
NEUTS SEG NFR BLD: 70 % (ref 43–78)
NRBC # BLD: 0 K/UL (ref 0–0.2)
PLATELET # BLD AUTO: 264 K/UL (ref 150–450)
PMV BLD AUTO: 9.5 FL (ref 9.4–12.3)
POTASSIUM SERPL-SCNC: 3.6 MMOL/L (ref 3.5–5.1)
PROT SERPL-MCNC: 7.6 G/DL (ref 6.3–8.2)
RBC # BLD AUTO: 4.95 M/UL (ref 4.05–5.2)
SODIUM SERPL-SCNC: 142 MMOL/L (ref 136–146)
WBC # BLD AUTO: 9.6 K/UL (ref 4.3–11.1)

## 2024-03-03 PROCEDURE — 99285 EMERGENCY DEPT VISIT HI MDM: CPT

## 2024-03-03 PROCEDURE — 85025 COMPLETE CBC W/AUTO DIFF WBC: CPT

## 2024-03-03 PROCEDURE — 74177 CT ABD & PELVIS W/CONTRAST: CPT

## 2024-03-03 PROCEDURE — 2580000003 HC RX 258: Performed by: EMERGENCY MEDICINE

## 2024-03-03 PROCEDURE — 80053 COMPREHEN METABOLIC PANEL: CPT

## 2024-03-03 PROCEDURE — 6360000004 HC RX CONTRAST MEDICATION: Performed by: EMERGENCY MEDICINE

## 2024-03-03 PROCEDURE — 83690 ASSAY OF LIPASE: CPT

## 2024-03-03 RX ORDER — SODIUM CHLORIDE, SODIUM LACTATE, POTASSIUM CHLORIDE, AND CALCIUM CHLORIDE .6; .31; .03; .02 G/100ML; G/100ML; G/100ML; G/100ML
1000 INJECTION, SOLUTION INTRAVENOUS ONCE
Status: COMPLETED | OUTPATIENT
Start: 2024-03-03 | End: 2024-03-03

## 2024-03-03 RX ADMIN — IOPAMIDOL 100 ML: 755 INJECTION, SOLUTION INTRAVENOUS at 12:35

## 2024-03-03 RX ADMIN — SODIUM CHLORIDE, POTASSIUM CHLORIDE, SODIUM LACTATE AND CALCIUM CHLORIDE 1000 ML: 600; 310; 30; 20 INJECTION, SOLUTION INTRAVENOUS at 11:37

## 2024-03-03 ASSESSMENT — PAIN SCALES - GENERAL
PAINLEVEL_OUTOF10: 0
PAINLEVEL_OUTOF10: 5

## 2024-03-03 ASSESSMENT — PAIN - FUNCTIONAL ASSESSMENT
PAIN_FUNCTIONAL_ASSESSMENT: 0-10
PAIN_FUNCTIONAL_ASSESSMENT: 0-10

## 2024-03-03 ASSESSMENT — LIFESTYLE VARIABLES
HOW OFTEN DO YOU HAVE A DRINK CONTAINING ALCOHOL: NEVER
HOW MANY STANDARD DRINKS CONTAINING ALCOHOL DO YOU HAVE ON A TYPICAL DAY: PATIENT DOES NOT DRINK

## 2024-03-03 NOTE — ED TRIAGE NOTES
Patient arrived today with a complaint of abdominal pain, nausea, chills and no bowel movement for last 3 days. Patient denies any other symptoms

## 2024-03-03 NOTE — ED NOTES
I have reviewed discharge instructions with the patient.  The patient verbalized understanding.    Patient left ED via Discharge Method: ambulatory to Home with spouse.     Opportunity for questions and clarification provided.       Patient given 0 scripts.         To continue your aftercare when you leave the hospital, you may receive an automated call from our care team to check in on how you are doing.  This is a free service and part of our promise to provide the best care and service to meet your aftercare needs.” If you have questions, or wish to unsubscribe from this service please call [phone].  Thank you for Choosing our [facility] Emergency Department.       Rocco Ramirez RN  03/03/24 2970

## 2024-03-03 NOTE — ED PROVIDER NOTES
Emergency Department Provider Note       PCP: Raffy Yusuf MD   Age: 74 y.o.   Sex: female     DISPOSITION Decision To Discharge 03/03/2024 02:02:15 PM       ICD-10-CM    1. Lung nodule  R91.1       2. Hypodense mass of liver  R16.0       3. Other constipation  K59.09           Medical Decision Making     Level 5 [id number]  74-year-old female presents the emergency department with chief complaint of acute abdominal pain.  Vital signs here are reviewed.  External records were reviewed.  Shared decision-making was utilized in the patient's visit.  Patient describes the left lower quadrant abdominal pain.  She also states that the pain tends to come in waves.  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  She obviously sounds like she has some constipation but concern for potential underlying pathology causing her chills, due to this lab work here was obtained.  Patient was given fluids as well as a CT of the abdomen pelvis was obtained CBC, CMP are unremarkable.  CT abdomen pelvis here shows mild colitis as well as stool burden with constipation.  Incidental notes of hypodense liver lesion and lung nodule.  Both unchanged from prior imaging.  Patient was made aware of these as well as given recommendations on constipation for home.  Patient was given return precautions patient stable and discharge examination     1 acute complicated illness or injury.  Shared medical decision making was utilized in creating the patients health plan today.    I independently ordered and reviewed each unique test.  I reviewed external records: ED visit note from an outside group.  I reviewed external records: provider visit note from PCP.  I reviewed external records: provider visit note from outside specialist.                   History     74-year-old female presents to the emergency department via private vehicle with chief complaint of abdominal pain.  Patient reports a 3-day

## 2024-03-03 NOTE — DISCHARGE INSTRUCTIONS
For your constipation I recommend taking a capful of MiraLAX with 8 ounces of water every hour until you have a large stool.  We have incidentally seen lung nodules as well as hypodense mass of the liver which are unchanged.  Please have continued follow-up with your primary care doctor.

## 2024-04-10 ENCOUNTER — HOSPITAL ENCOUNTER (OUTPATIENT)
Dept: MAMMOGRAPHY | Age: 74
Discharge: HOME | End: 2024-04-13

## 2024-04-10 DIAGNOSIS — R92.8 ABNORMAL MAMMOGRAM OF BOTH BREASTS: ICD-10-CM

## 2024-04-12 ENCOUNTER — HOSPITAL ENCOUNTER (OUTPATIENT)
Dept: MAMMOGRAPHY | Age: 74
End: 2024-04-12
Payer: MEDICARE

## 2024-04-12 DIAGNOSIS — R92.0 MICROCALCIFICATION OF LEFT BREAST ON MAMMOGRAPHY: ICD-10-CM

## 2024-04-12 PROCEDURE — G0279 TOMOSYNTHESIS, MAMMO: HCPCS

## 2024-04-16 DIAGNOSIS — C73 THYROID CANCER (HCC): ICD-10-CM

## 2024-04-16 DIAGNOSIS — E89.0 POSTSURGICAL HYPOTHYROIDISM: ICD-10-CM

## 2024-04-16 LAB
T4 FREE SERPL-MCNC: 1.2 NG/DL (ref 0.78–1.46)
TSH W FREE THYROID IF ABNORMAL: 0.1 UIU/ML (ref 0.36–3.74)

## 2024-04-18 LAB — THYROGLOB AB SERPL-ACNC: 131.4 IU/ML (ref 0–0.9)

## 2024-04-29 LAB
THYROGLOB AB SERPL-ACNC: 131.4 IU/ML (ref 0–0.9)
THYROGLOBULIN: 3.2 NG/ML

## 2024-05-06 ENCOUNTER — OFFICE VISIT (OUTPATIENT)
Dept: ENDOCRINOLOGY | Age: 74
End: 2024-05-06
Payer: MEDICARE

## 2024-05-06 VITALS
DIASTOLIC BLOOD PRESSURE: 68 MMHG | HEIGHT: 66 IN | WEIGHT: 177.4 LBS | SYSTOLIC BLOOD PRESSURE: 122 MMHG | BODY MASS INDEX: 28.51 KG/M2 | HEART RATE: 78 BPM | OXYGEN SATURATION: 98 %

## 2024-05-06 DIAGNOSIS — E89.0 POSTSURGICAL HYPOTHYROIDISM: ICD-10-CM

## 2024-05-06 DIAGNOSIS — R91.8 PULMONARY NODULES: ICD-10-CM

## 2024-05-06 DIAGNOSIS — C73 THYROID CANCER (HCC): Primary | ICD-10-CM

## 2024-05-06 PROCEDURE — 1036F TOBACCO NON-USER: CPT | Performed by: INTERNAL MEDICINE

## 2024-05-06 PROCEDURE — G8417 CALC BMI ABV UP PARAM F/U: HCPCS | Performed by: INTERNAL MEDICINE

## 2024-05-06 PROCEDURE — G2211 COMPLEX E/M VISIT ADD ON: HCPCS | Performed by: INTERNAL MEDICINE

## 2024-05-06 PROCEDURE — 1090F PRES/ABSN URINE INCON ASSESS: CPT | Performed by: INTERNAL MEDICINE

## 2024-05-06 PROCEDURE — 1123F ACP DISCUSS/DSCN MKR DOCD: CPT | Performed by: INTERNAL MEDICINE

## 2024-05-06 PROCEDURE — 99214 OFFICE O/P EST MOD 30 MIN: CPT | Performed by: INTERNAL MEDICINE

## 2024-05-06 PROCEDURE — 3074F SYST BP LT 130 MM HG: CPT | Performed by: INTERNAL MEDICINE

## 2024-05-06 PROCEDURE — 3017F COLORECTAL CA SCREEN DOC REV: CPT | Performed by: INTERNAL MEDICINE

## 2024-05-06 PROCEDURE — NBSRV NON-BILLABLE SERVICE: Performed by: INTERNAL MEDICINE

## 2024-05-06 PROCEDURE — G8400 PT W/DXA NO RESULTS DOC: HCPCS | Performed by: INTERNAL MEDICINE

## 2024-05-06 PROCEDURE — 3078F DIAST BP <80 MM HG: CPT | Performed by: INTERNAL MEDICINE

## 2024-05-06 PROCEDURE — G8427 DOCREV CUR MEDS BY ELIG CLIN: HCPCS | Performed by: INTERNAL MEDICINE

## 2024-05-06 RX ORDER — ROSUVASTATIN CALCIUM 20 MG/1
20 TABLET, COATED ORAL DAILY
COMMUNITY
Start: 2023-12-15

## 2024-05-06 RX ORDER — LEVOTHYROXINE SODIUM 88 UG/1
88 TABLET ORAL DAILY
Qty: 90 TABLET | Refills: 3 | Status: SHIPPED | OUTPATIENT
Start: 2024-05-06

## 2024-05-06 ASSESSMENT — ENCOUNTER SYMPTOMS
CONSTIPATION: 1
ROS SKIN COMMENTS: DENIES HAIR LOSS, DRY SKIN.
DIARRHEA: 0
SHORTNESS OF BREATH: 0
COUGH: 1

## 2024-09-26 DIAGNOSIS — E89.0 POSTSURGICAL HYPOTHYROIDISM: ICD-10-CM

## 2024-09-26 RX ORDER — LEVOTHYROXINE SODIUM 88 UG/1
88 TABLET ORAL DAILY
Qty: 90 TABLET | Refills: 3 | Status: SHIPPED | OUTPATIENT
Start: 2024-09-26

## 2024-10-02 DIAGNOSIS — C73 THYROID CANCER (HCC): ICD-10-CM

## 2024-10-02 DIAGNOSIS — E89.0 POSTSURGICAL HYPOTHYROIDISM: ICD-10-CM

## 2024-10-04 LAB
T4 FREE SERPL-MCNC: 1.8 NG/DL (ref 0.9–1.7)
THYROGLOB AB SERPL-ACNC: 87.1 IU/ML (ref 0–0.9)
TSH W FREE THYROID IF ABNORMAL: 0.19 UIU/ML (ref 0.27–4.2)

## 2024-10-07 ENCOUNTER — HOSPITAL ENCOUNTER (OUTPATIENT)
Dept: ULTRASOUND IMAGING | Age: 74
Discharge: HOME | End: 2024-10-10
Attending: INTERNAL MEDICINE
Payer: MEDICARE

## 2024-10-07 ENCOUNTER — HOSPITAL ENCOUNTER (OUTPATIENT)
Dept: CT IMAGING | Age: 74
Discharge: HOME | End: 2024-10-10
Attending: INTERNAL MEDICINE
Payer: MEDICARE

## 2024-10-07 DIAGNOSIS — C73 THYROID CANCER (HCC): ICD-10-CM

## 2024-10-07 PROCEDURE — 71250 CT THORAX DX C-: CPT

## 2024-10-07 PROCEDURE — 76536 US EXAM OF HEAD AND NECK: CPT

## 2024-10-09 ENCOUNTER — TRANSCRIBE ORDERS (OUTPATIENT)
Dept: SCHEDULING | Age: 74
End: 2024-10-09

## 2024-10-09 DIAGNOSIS — R92.0 MICROCALCIFICATION OF LEFT BREAST ON MAMMOGRAM: Primary | ICD-10-CM

## 2024-10-15 ENCOUNTER — HOSPITAL ENCOUNTER (OUTPATIENT)
Dept: MAMMOGRAPHY | Age: 74
Discharge: HOME | End: 2024-10-18

## 2024-10-15 DIAGNOSIS — R92.0 MICROCALCIFICATION OF LEFT BREAST ON MAMMOGRAM: ICD-10-CM

## 2024-10-15 ASSESSMENT — ENCOUNTER SYMPTOMS
ROS SKIN COMMENTS: DENIES HAIR LOSS, DRY SKIN.
COUGH: 1
DIARRHEA: 0
CONSTIPATION: 1
SHORTNESS OF BREATH: 0

## 2024-10-15 NOTE — PROGRESS NOTES
Rod Catalan MD, [facility]  [address], [address]  [place] [address]  Phone [phone]  Facsimile [phone]          Lizzy Gautam is a 74 y.o. female seen 10/16/2024 for follow-up of thyroid cancer and hypothyroidism        ASSESSMENT AND PLAN:    1. 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  Although there were no high risk features on final pathology, her postoperative thyroglobulin level was higher than expected in the setting of markedly elevated thyroglobulin antibodies.  Neck ultrasound 9/2022 revealed abnormal bilateral cervical lymph nodes.  She is status post benign FNA biopsy of the right level 3 lymph node 9/2022.  Thyroglobulin washout was negative as well.  Chest CT 9/2022 revealed stable pulmonary micronodules in the right lung.  She is status post MUHAMMAD-131 103 mCi 11/2022.  Posttreatment whole-body scan revealed uptake in the neck only as expected.  Thyrogen stimulated whole-body scan 10/2023 showed no uptake in the neck.  There was focal uptake in the anterior superior mediastinum, lower thoracic spine and possibly the liver.  There was no anatomic correlate on CT imaging, however.  Her stimulated thyroglobulin level did not increase significantly compared to her basal thyroglobulin level.  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  Her thyroglobulin antibodies have remained elevated but have decreased.  Her thyroglobulin level by TESSA has slowly decreased and remained fairly stable.  I am awaiting her most recent thyroglobulin level.  Neck ultrasound 10/2024 revealed that the right level 2 lymph node had enlarged compared to the prior examination.  I will

## 2024-10-16 ENCOUNTER — OFFICE VISIT (OUTPATIENT)
Dept: ENDOCRINOLOGY | Age: 74
End: 2024-10-16
Payer: MEDICARE

## 2024-10-16 VITALS
DIASTOLIC BLOOD PRESSURE: 68 MMHG | SYSTOLIC BLOOD PRESSURE: 112 MMHG | OXYGEN SATURATION: 98 % | HEART RATE: 68 BPM | WEIGHT: 176 LBS | RESPIRATION RATE: 18 BRPM | HEIGHT: 66 IN | BODY MASS INDEX: 28.28 KG/M2

## 2024-10-16 DIAGNOSIS — C73 THYROID CANCER (HCC): Primary | ICD-10-CM

## 2024-10-16 DIAGNOSIS — R91.8 PULMONARY NODULES: ICD-10-CM

## 2024-10-16 DIAGNOSIS — E89.0 POSTSURGICAL HYPOTHYROIDISM: ICD-10-CM

## 2024-10-16 DIAGNOSIS — R59.0 CERVICAL LYMPHADENOPATHY: ICD-10-CM

## 2024-10-16 DIAGNOSIS — C73 THYROID CANCER (HCC): ICD-10-CM

## 2024-10-16 DIAGNOSIS — R59.0 CERVICAL LYMPHADENOPATHY: Primary | ICD-10-CM

## 2024-10-16 PROCEDURE — 3074F SYST BP LT 130 MM HG: CPT | Performed by: INTERNAL MEDICINE

## 2024-10-16 PROCEDURE — 1090F PRES/ABSN URINE INCON ASSESS: CPT | Performed by: INTERNAL MEDICINE

## 2024-10-16 PROCEDURE — 1036F TOBACCO NON-USER: CPT | Performed by: INTERNAL MEDICINE

## 2024-10-16 PROCEDURE — G2211 COMPLEX E/M VISIT ADD ON: HCPCS | Performed by: INTERNAL MEDICINE

## 2024-10-16 PROCEDURE — G8400 PT W/DXA NO RESULTS DOC: HCPCS | Performed by: INTERNAL MEDICINE

## 2024-10-16 PROCEDURE — 1123F ACP DISCUSS/DSCN MKR DOCD: CPT | Performed by: INTERNAL MEDICINE

## 2024-10-16 PROCEDURE — 3017F COLORECTAL CA SCREEN DOC REV: CPT | Performed by: INTERNAL MEDICINE

## 2024-10-16 PROCEDURE — 99214 OFFICE O/P EST MOD 30 MIN: CPT | Performed by: INTERNAL MEDICINE

## 2024-10-16 PROCEDURE — 3078F DIAST BP <80 MM HG: CPT | Performed by: INTERNAL MEDICINE

## 2024-10-16 PROCEDURE — G8484 FLU IMMUNIZE NO ADMIN: HCPCS | Performed by: INTERNAL MEDICINE

## 2024-10-16 PROCEDURE — G8427 DOCREV CUR MEDS BY ELIG CLIN: HCPCS | Performed by: INTERNAL MEDICINE

## 2024-10-16 PROCEDURE — G8417 CALC BMI ABV UP PARAM F/U: HCPCS | Performed by: INTERNAL MEDICINE

## 2024-10-16 RX ORDER — LEVOTHYROXINE SODIUM 88 UG/1
88 TABLET ORAL DAILY
Qty: 90 TABLET | Refills: 3 | Status: SHIPPED | OUTPATIENT
Start: 2024-10-16

## 2024-10-21 LAB
THYROGLOB AB SERPL-ACNC: 87.1 IU/ML (ref 0–0.9)
THYROGLOBULIN: 2.3 NG/ML

## 2024-10-24 ENCOUNTER — HOSPITAL ENCOUNTER (OUTPATIENT)
Dept: ULTRASOUND IMAGING | Age: 74
Discharge: HOME | End: 2024-10-27
Attending: INTERNAL MEDICINE
Payer: MEDICARE

## 2024-10-24 VITALS
HEIGHT: 66 IN | DIASTOLIC BLOOD PRESSURE: 89 MMHG | SYSTOLIC BLOOD PRESSURE: 194 MMHG | TEMPERATURE: 97.6 F | BODY MASS INDEX: 28.28 KG/M2 | HEART RATE: 78 BPM | RESPIRATION RATE: 18 BRPM | WEIGHT: 176 LBS | OXYGEN SATURATION: 95 %

## 2024-10-24 DIAGNOSIS — C73 THYROID CANCER (HCC): ICD-10-CM

## 2024-10-24 DIAGNOSIS — R59.0 CERVICAL LYMPHADENOPATHY: ICD-10-CM

## 2024-10-24 PROCEDURE — 10005 FNA BX W/US GDN 1ST LES: CPT

## 2024-10-24 PROCEDURE — 88305 TISSUE EXAM BY PATHOLOGIST: CPT

## 2024-10-24 PROCEDURE — 10005 FNA BX W/US GDN 1ST LES: CPT | Performed by: PHYSICIAN ASSISTANT

## 2024-10-24 PROCEDURE — 88173 CYTOPATH EVAL FNA REPORT: CPT

## 2024-10-24 PROCEDURE — 2500000003 HC RX 250 WO HCPCS: Performed by: PHYSICIAN ASSISTANT

## 2024-10-24 PROCEDURE — 76942 ECHO GUIDE FOR BIOPSY: CPT

## 2024-10-24 RX ORDER — LIDOCAINE HYDROCHLORIDE 10 MG/ML
INJECTION, SOLUTION EPIDURAL; INFILTRATION; INTRACAUDAL; PERINEURAL PRN
Status: COMPLETED | OUTPATIENT
Start: 2024-10-24 | End: 2024-10-24

## 2024-10-24 RX ADMIN — LIDOCAINE HYDROCHLORIDE 3 ML: 10 INJECTION, SOLUTION EPIDURAL; INFILTRATION; INTRACAUDAL; PERINEURAL at 13:32

## 2024-10-24 NOTE — OP NOTE
PROCEDURE: Ultrasound-guided fine-needle aspiration    Procedural Personnel  : Arlette Mejia PA-C.    Supervising physician: George Rudolph MD  The physician attests to supervising this procedure and agrees with the images  and report as written.    Procedure Date (mm/dd/yyyy): 10/24/2024 2:25 PM    Pre-procedure diagnosis: Papillary thyroid carcinoma, right cervical adenopathy  Post-procedure diagnosis: Same  Indication: Histopathologic diagnosis      Complications: None   Impression:       Ultrasound-guided aspiration of a right cervical lymph node.    Plan:    Specimen(s) sent for evaluation.  _______________________________________________________________    PROCEDURE SUMMARY:  - Percutaneous US-guided right cervical lymph node fine needle aspiration biopsy  - Additional procedure(s): None    PROCEDURE DETAILS:    Pre-procedure  Reference imaging for biopsy target: None  Consent: Informed consent for the procedure including risks, benefits and  alternatives was obtained and time-out was performed prior to the procedure.  Preparation: The site was prepared and draped using maximal sterile barrier  technique including cutaneous antisepsis.    Anesthesia/sedation  Level of anesthesia/sedation: Local    Imaging prior to biopsy  The patient was positioned supine. Initial imaging was performed.  Biopsy target:  - Organ or target location: Right cervical lymph node  - Maximal diameter (cm): 2  Other findings: None    Biopsy  Local anesthesia was administered. Under US guidance, 3 fine-needle aspirations  were obtained using 22 and 25-gauge needles. The specimens were placed in  CytoLyt and saline for thyroglobulin washout    A sterile dressing was applied.    Imaging following biopsy  Immediate post-biopsy ultrasound was performed.  Post-biopsy imaging findings: No post biopsy bleeding    Permanent ultrasound images were obtained and stored in PACS

## 2024-10-24 NOTE — FLOWSHEET NOTE
Recovery period without difficulty. Pt alert and oriented and denies pain. Dressing is clean, dry, and intact. Reviewed discharge instructions with patient who verbalized understanding. Pt ambulatory off the unit with no distress observed.

## 2024-10-24 NOTE — DISCHARGE INSTRUCTIONS
If you have any questions about your procedure, please call the Interventional Radiology department at [phone].     After business hours (5pm) and weekends, call the answering service at [phone] and ask for the Interventional Radiologist on call to be paged.

## 2024-10-25 LAB
CYTOLOGY-NON GYN: NORMAL
SPECIMEN SOURCE: NORMAL

## 2025-03-18 ENCOUNTER — OFFICE VISIT (OUTPATIENT)
Dept: ORTHOPEDIC SURGERY | Age: 75
End: 2025-03-18

## 2025-03-18 VITALS — HEIGHT: 66 IN | BODY MASS INDEX: 28.93 KG/M2 | WEIGHT: 180 LBS

## 2025-03-18 DIAGNOSIS — M25.551 RIGHT HIP PAIN: Primary | ICD-10-CM

## 2025-03-18 DIAGNOSIS — M54.50 LUMBAR BACK PAIN: ICD-10-CM

## 2025-03-18 RX ORDER — PRIMIDONE 50 MG/1
50 TABLET ORAL 3 TIMES DAILY
COMMUNITY

## 2025-03-18 RX ORDER — MULTIVIT-MIN/IRON/FOLIC ACID/K 18-600-40
2000 CAPSULE ORAL DAILY
COMMUNITY

## 2025-03-18 RX ORDER — METHYLPREDNISOLONE 4 MG/1
TABLET ORAL
Qty: 1 KIT | Refills: 0 | Status: SHIPPED | OUTPATIENT
Start: 2025-03-18

## 2025-03-18 NOTE — PROGRESS NOTES
Name: Lizzy Gautam  YOB: 1950  Gender: female  MRN: [id number]      CC: Hip Pain (R)       HPI: Lizzy Gautam is a 75 y.o. female who presents with Hip Pain (R)  She is a new patient coming in with right hip pain for 5 years. She first noticed the pain when she was walking. It has been on and off over the years but she developed a different pain about 3 weeks ago. She had aching pain while she was sitting. She had 2 different flare ups of this pain. She has a history of herniated discs in her spine and gets epidural injections on a regular basis.  She has had a total of 8 epidural steroid injections between [facility] in [place] where she has previously lived.  She relocated to [place] a few years ago.  She is an OB/GYN physician.        ROS/Meds/PSH/PMH/FH/SH: I personally reviewed the patients standard intake form.  Below are the pertinents    Tobacco:  reports that she has never smoked. She has never used smokeless tobacco.  Diabetes: none  Other: HLD    Physical Examination:  General: no acute distress  Lungs: breathing easily  CV: regular rhythm by pulse  Right Hip: She has no significant pain with logroll internal/external rotation.  Flexion past 90 degrees internal rotation about 10 with mild discomfort with FADIR external rotation to 40 with no significant discomfort with KELLIE.  Negative straight leg raise.  She has some mild tenderness over the piriformis and posterior lateral hip structures no midline lumbar spine tenderness.  Straight leg raise does not recreate symptoms today.  She has 2+ DTRs and 5 out of 5 motor strength as well as sensation intact L2-S1      Imaging:   Hip/pelvis XR: 2 views     Clinical Indication  1. Right hip pain    2. Lumbar back pain           Report: AP pelvis and frog lateral x-ray of the Right hip demonstrates mild degenerative changes no acute fracture dislocation    Impression: No acute as above, no acute findings            All imaging interpreted by

## 2025-04-07 DIAGNOSIS — C73 THYROID CANCER (HCC): ICD-10-CM

## 2025-04-07 DIAGNOSIS — E89.0 POSTSURGICAL HYPOTHYROIDISM: ICD-10-CM

## 2025-04-07 LAB — TSH W FREE THYROID IF ABNORMAL: 0.32 UIU/ML (ref 0.27–4.2)

## 2025-04-14 ENCOUNTER — TRANSCRIBE ORDERS (OUTPATIENT)
Dept: SCHEDULING | Age: 75
End: 2025-04-14

## 2025-04-14 DIAGNOSIS — N63.0 MASS OF BREAST, UNSPECIFIED LATERALITY: Primary | ICD-10-CM

## 2025-04-14 DIAGNOSIS — R92.8 ABNORMAL MAMMOGRAM OF BOTH BREASTS: Primary | ICD-10-CM

## 2025-04-19 LAB
THYROGLOB AB SERPL-ACNC: 100.1 IU/ML (ref 0–0.9)
THYROGLOBULIN: 2.6 NG/ML

## 2025-04-23 ENCOUNTER — OFFICE VISIT (OUTPATIENT)
Dept: ENDOCRINOLOGY | Age: 75
End: 2025-04-23
Payer: MEDICARE

## 2025-04-23 VITALS
HEIGHT: 66 IN | BODY MASS INDEX: 28.93 KG/M2 | DIASTOLIC BLOOD PRESSURE: 76 MMHG | HEART RATE: 75 BPM | OXYGEN SATURATION: 98 % | RESPIRATION RATE: 14 BRPM | SYSTOLIC BLOOD PRESSURE: 132 MMHG | WEIGHT: 180 LBS

## 2025-04-23 DIAGNOSIS — R59.0 CERVICAL LYMPHADENOPATHY: ICD-10-CM

## 2025-04-23 DIAGNOSIS — R91.8 PULMONARY NODULES: ICD-10-CM

## 2025-04-23 DIAGNOSIS — E89.0 POSTSURGICAL HYPOTHYROIDISM: ICD-10-CM

## 2025-04-23 DIAGNOSIS — C73 THYROID CANCER (HCC): Primary | ICD-10-CM

## 2025-04-23 PROCEDURE — 1090F PRES/ABSN URINE INCON ASSESS: CPT | Performed by: INTERNAL MEDICINE

## 2025-04-23 PROCEDURE — G8400 PT W/DXA NO RESULTS DOC: HCPCS | Performed by: INTERNAL MEDICINE

## 2025-04-23 PROCEDURE — G8417 CALC BMI ABV UP PARAM F/U: HCPCS | Performed by: INTERNAL MEDICINE

## 2025-04-23 PROCEDURE — 3017F COLORECTAL CA SCREEN DOC REV: CPT | Performed by: INTERNAL MEDICINE

## 2025-04-23 PROCEDURE — 1123F ACP DISCUSS/DSCN MKR DOCD: CPT | Performed by: INTERNAL MEDICINE

## 2025-04-23 PROCEDURE — 3075F SYST BP GE 130 - 139MM HG: CPT | Performed by: INTERNAL MEDICINE

## 2025-04-23 PROCEDURE — G2211 COMPLEX E/M VISIT ADD ON: HCPCS | Performed by: INTERNAL MEDICINE

## 2025-04-23 PROCEDURE — 1036F TOBACCO NON-USER: CPT | Performed by: INTERNAL MEDICINE

## 2025-04-23 PROCEDURE — G8427 DOCREV CUR MEDS BY ELIG CLIN: HCPCS | Performed by: INTERNAL MEDICINE

## 2025-04-23 PROCEDURE — 99214 OFFICE O/P EST MOD 30 MIN: CPT | Performed by: INTERNAL MEDICINE

## 2025-04-23 PROCEDURE — 1159F MED LIST DOCD IN RCRD: CPT | Performed by: INTERNAL MEDICINE

## 2025-04-23 PROCEDURE — 3078F DIAST BP <80 MM HG: CPT | Performed by: INTERNAL MEDICINE

## 2025-04-23 RX ORDER — LEVOTHYROXINE SODIUM 88 UG/1
88 TABLET ORAL DAILY
Qty: 90 TABLET | Refills: 3 | Status: SHIPPED | OUTPATIENT
Start: 2025-04-23

## 2025-04-23 ASSESSMENT — ENCOUNTER SYMPTOMS
ROS SKIN COMMENTS: DENIES HAIR LOSS, DRY SKIN.
DIARRHEA: 0
COUGH: 1
CONSTIPATION: 0
SHORTNESS OF BREATH: 0

## 2025-04-23 NOTE — PROGRESS NOTES
Rod Catalan MD, [facility]  [address] [place] [address], [address]  [place] [address]  Phone [phone]  Facsimile [phone]          Lizzy Gautam is a 75 y.o. female seen 4/23/2025 for follow-up of thyroid cancer and hypothyroidism        ASSESSMENT AND PLAN:    1. 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  Although there were no high risk features on final pathology, her postoperative thyroglobulin level was higher than expected in the setting of markedly elevated thyroglobulin antibodies.  Neck ultrasound 9/2022 revealed abnormal bilateral cervical lymph nodes.  She is status post benign FNA biopsy of the right level 3 lymph node 9/2022.  Thyroglobulin washout was negative as well.  Chest CT 9/2022 revealed stable pulmonary micronodules in the right lung.  She is status post MUHAMMAD-131 103 mCi 11/2022.  Posttreatment whole-body scan revealed uptake in the neck only as expected.  Thyrogen stimulated whole-body scan 10/2023 showed no uptake in the neck.  There was focal uptake in the anterior superior mediastinum, lower thoracic spine and possibly the liver.  There was no anatomic correlate on CT imaging, however.  Her stimulated thyroglobulin level did not increase significantly compared to her basal thyroglobulin level.  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  Neck ultrasound 10/2024 revealed that the right level 2 lymph node had enlarged compared to the prior examination.  She is status post benign FNA biopsy of the right level 2 lymph node 10/2024.  Chest CT 10/2024 revealed that the pulmonary nodules were stable in size.  Her thyroglobulin antibodies have remained positive but have

## 2025-05-01 ENCOUNTER — HOSPITAL ENCOUNTER (OUTPATIENT)
Dept: MAMMOGRAPHY | Age: 75
Discharge: HOME | End: 2025-05-01
Payer: MEDICARE

## 2025-05-01 ENCOUNTER — HOSPITAL ENCOUNTER (OUTPATIENT)
Dept: MAMMOGRAPHY | Age: 75
End: 2025-05-01
Payer: MEDICARE

## 2025-05-01 VITALS — BODY MASS INDEX: 28.61 KG/M2 | WEIGHT: 178 LBS | HEIGHT: 66 IN

## 2025-05-01 DIAGNOSIS — R92.8 ABNORMAL MAMMOGRAM OF BOTH BREASTS: ICD-10-CM

## 2025-05-01 DIAGNOSIS — N63.0 MASS OF BREAST, UNSPECIFIED LATERALITY: ICD-10-CM

## 2025-05-01 PROCEDURE — G0279 TOMOSYNTHESIS, MAMMO: HCPCS

## 2025-05-01 PROCEDURE — 76642 ULTRASOUND BREAST LIMITED: CPT

## 2025-08-02 ENCOUNTER — HOSPITAL ENCOUNTER (EMERGENCY)
Age: 75
Discharge: HOME | End: 2025-08-02
Attending: STUDENT IN AN ORGANIZED HEALTH CARE EDUCATION/TRAINING PROGRAM
Payer: MEDICARE

## 2025-08-02 VITALS
OXYGEN SATURATION: 95 % | SYSTOLIC BLOOD PRESSURE: 133 MMHG | WEIGHT: 178 LBS | DIASTOLIC BLOOD PRESSURE: 77 MMHG | HEART RATE: 84 BPM | HEIGHT: 66 IN | RESPIRATION RATE: 16 BRPM | BODY MASS INDEX: 28.61 KG/M2 | TEMPERATURE: 98.1 F

## 2025-08-02 DIAGNOSIS — K29.00 ACUTE GASTRITIS WITHOUT HEMORRHAGE, UNSPECIFIED GASTRITIS TYPE: Primary | ICD-10-CM

## 2025-08-02 LAB
ALBUMIN SERPL-MCNC: 3.6 G/DL (ref 3.2–4.6)
ALBUMIN/GLOB SERPL: 1 (ref 1–1.9)
ALP SERPL-CCNC: 89 U/L (ref 35–104)
ALT SERPL-CCNC: 32 U/L (ref 8–45)
ANION GAP SERPL CALC-SCNC: 14 MMOL/L (ref 7–16)
APPEARANCE UR: CLEAR
AST SERPL-CCNC: 32 U/L (ref 15–37)
BACTERIA URNS QL MICRO: NEGATIVE /HPF
BASOPHILS # BLD: 0.09 K/UL (ref 0–0.2)
BASOPHILS NFR BLD: 1.2 % (ref 0–2)
BILIRUB SERPL-MCNC: 0.5 MG/DL (ref 0–1.2)
BILIRUB UR QL: NEGATIVE
BUN SERPL-MCNC: 10 MG/DL (ref 8–23)
CALCIUM SERPL-MCNC: 9.9 MG/DL (ref 8.8–10.2)
CASTS URNS QL MICRO: 0 /LPF
CHLORIDE SERPL-SCNC: 102 MMOL/L (ref 98–107)
CO2 SERPL-SCNC: 21 MMOL/L (ref 20–29)
COLOR UR: NORMAL
CREAT SERPL-MCNC: 0.91 MG/DL (ref 0.6–1.1)
CRYSTALS URNS QL MICRO: 0 /LPF
DIFFERENTIAL METHOD BLD: NORMAL
EOSINOPHIL # BLD: 0.1 K/UL (ref 0–0.8)
EOSINOPHIL NFR BLD: 1.3 % (ref 0.5–7.8)
EPI CELLS #/AREA URNS HPF: NORMAL /HPF
ERYTHROCYTE [DISTWIDTH] IN BLOOD BY AUTOMATED COUNT: 13.1 % (ref 11.9–14.6)
GLOBULIN SER CALC-MCNC: 3.7 G/DL (ref 2.3–3.5)
GLUCOSE SERPL-MCNC: 132 MG/DL (ref 70–99)
GLUCOSE UR STRIP.AUTO-MCNC: NEGATIVE MG/DL
HCT VFR BLD AUTO: 41.1 % (ref 35.8–46.3)
HGB BLD-MCNC: 13.9 G/DL (ref 11.7–15.4)
HGB UR QL STRIP: NEGATIVE
HYALINE CASTS URNS QL MICRO: NORMAL /LPF
IMM GRANULOCYTES # BLD AUTO: 0.01 K/UL (ref 0–0.5)
IMM GRANULOCYTES NFR BLD AUTO: 0.1 % (ref 0–5)
KETONES UR QL STRIP.AUTO: NEGATIVE MG/DL
LEUKOCYTE ESTERASE UR QL STRIP.AUTO: NEGATIVE
LIPASE SERPL-CCNC: 24 U/L (ref 13–60)
LYMPHOCYTES # BLD: 3.11 K/UL (ref 0.5–4.6)
LYMPHOCYTES NFR BLD: 40.7 % (ref 13–44)
MCH RBC QN AUTO: 29.1 PG (ref 26.1–32.9)
MCHC RBC AUTO-ENTMCNC: 33.8 G/DL (ref 31.4–35)
MCV RBC AUTO: 86.2 FL (ref 82–102)
MONOCYTES # BLD: 0.46 K/UL (ref 0.1–1.3)
MONOCYTES NFR BLD: 6 % (ref 4–12)
MUCOUS THREADS URNS QL MICRO: 0 /LPF
NEUTS SEG # BLD: 3.87 K/UL (ref 1.7–8.2)
NEUTS SEG NFR BLD: 50.7 % (ref 43–78)
NITRITE UR QL STRIP.AUTO: NEGATIVE
NRBC # BLD: 0 K/UL (ref 0–0.2)
PH UR STRIP: 7 (ref 5–9)
PLATELET # BLD AUTO: 267 K/UL (ref 150–450)
PMV BLD AUTO: 9.4 FL (ref 9.4–12.3)
POTASSIUM SERPL-SCNC: 3.7 MMOL/L (ref 3.5–5.1)
PROT SERPL-MCNC: 7.3 G/DL (ref 6.3–8.2)
PROT UR STRIP-MCNC: NEGATIVE MG/DL
RBC # BLD AUTO: 4.77 M/UL (ref 4.05–5.2)
RBC #/AREA URNS HPF: NORMAL /HPF
SODIUM SERPL-SCNC: 137 MMOL/L (ref 136–145)
SP GR UR REFRACTOMETRY: 1.01 (ref 1–1.02)
URINE CULTURE IF INDICATED: NORMAL
UROBILINOGEN UR QL STRIP.AUTO: 1 EU/DL (ref 0.2–1)
WBC # BLD AUTO: 7.6 K/UL (ref 4.3–11.1)
WBC URNS QL MICRO: NORMAL /HPF

## 2025-08-02 PROCEDURE — 6370000000 HC RX 637 (ALT 250 FOR IP): Performed by: STUDENT IN AN ORGANIZED HEALTH CARE EDUCATION/TRAINING PROGRAM

## 2025-08-02 PROCEDURE — 83690 ASSAY OF LIPASE: CPT

## 2025-08-02 PROCEDURE — 85025 COMPLETE CBC W/AUTO DIFF WBC: CPT

## 2025-08-02 PROCEDURE — 81001 URINALYSIS AUTO W/SCOPE: CPT

## 2025-08-02 PROCEDURE — 99283 EMERGENCY DEPT VISIT LOW MDM: CPT

## 2025-08-02 PROCEDURE — 80053 COMPREHEN METABOLIC PANEL: CPT

## 2025-08-02 RX ORDER — HYOSCYAMINE SULFATE 0.12 MG/1
0.12 TABLET SUBLINGUAL 4 TIMES DAILY PRN
Qty: 20 TABLET | Refills: 0 | Status: SHIPPED | OUTPATIENT
Start: 2025-08-02

## 2025-08-02 RX ORDER — ONDANSETRON 4 MG/1
4 TABLET, ORALLY DISINTEGRATING ORAL 3 TIMES DAILY PRN
Qty: 10 TABLET | Refills: 0 | Status: SHIPPED | OUTPATIENT
Start: 2025-08-02

## 2025-08-02 RX ORDER — MAGNESIUM HYDROXIDE/ALUMINUM HYDROXICE/SIMETHICONE 120; 1200; 1200 MG/30ML; MG/30ML; MG/30ML
30 SUSPENSION ORAL
Status: COMPLETED | OUTPATIENT
Start: 2025-08-02 | End: 2025-08-02

## 2025-08-02 RX ORDER — LIDOCAINE HYDROCHLORIDE 20 MG/ML
15 SOLUTION OROPHARYNGEAL
Status: COMPLETED | OUTPATIENT
Start: 2025-08-02 | End: 2025-08-02

## 2025-08-02 RX ORDER — SUCRALFATE 1 G/1
1 TABLET ORAL
Qty: 90 TABLET | Refills: 0 | Status: SHIPPED | OUTPATIENT
Start: 2025-08-02

## 2025-08-02 RX ADMIN — LIDOCAINE HYDROCHLORIDE 15 ML: 20 SOLUTION ORAL at 13:40

## 2025-08-02 RX ADMIN — ALUMINUM HYDROXIDE, MAGNESIUM HYDROXIDE, AND SIMETHICONE 30 ML: 200; 200; 20 SUSPENSION ORAL at 13:40

## 2025-08-02 ASSESSMENT — PAIN SCALES - GENERAL: PAINLEVEL_OUTOF10: 5

## 2025-08-02 ASSESSMENT — PAIN - FUNCTIONAL ASSESSMENT: PAIN_FUNCTIONAL_ASSESSMENT: 0-10

## 2025-08-02 ASSESSMENT — PAIN DESCRIPTION - LOCATION: LOCATION: ABDOMEN

## 2025-08-02 NOTE — ED PROVIDER NOTES
Emergency Department Provider Note       [facility] EMERGENCY DEPT   PCP: Reji Edmondson MD   Age: 75 y.o.   Sex: female     DISPOSITION Decision To Discharge 08/02/2025 02:14:01 PM    ICD-10-CM    1. Acute gastritis without hemorrhage, unspecified gastritis type  K29.00           Medical Decision Making     75-year-old female presents the emergency department complaint of abdominal discomfort.  Reports pain has been off and on for the past week with worsening discomfort this morning.  Reports epigastric and right upper quadrant tenderness.  Some nausea but no vomiting.  Denies fevers or chills.  Denies dysuria, constipation, diarrhea, melena.  Does report a prior history of ulcers.  Is compliant with her Protonix.  Does endorse drinking coffee daily and not being fully compliant with a low acid diet.  Patient given GI cocktail with improvement of her symptoms.  Blood work showed normal white count, stable H&H, normal electrolytes, kidney function, LFTs.  She is well-appearing and nontoxic.  Symptoms have improved after GI cocktail.  Will treat for gastritis with prescription for Carafate as well as Levsin and Zofran.  Close outpatient follow-up recommended along with return precautions.  Patient and family voiced understanding and agreement with this plan.     Complexity of Problem: One acute complaint requiring workup to rule out emergent etiology   Over the counter drug management performed.  Prescription drug management performed.  Shared medical decision making was utilized in creating the patients health plan today.  I independently ordered and reviewed each unique test.    I reviewed external records: provider visit note from PCP.   The patients assessment required an independent historian: .  The reason they were needed is important historical information not provided by the patient.                  History     75-year-old female presents the emergency department complaint of abdominal discomfort.

## 2025-08-02 NOTE — DISCHARGE INSTRUCTIONS
Continue taking your Protonix daily.  Avoid acidic foods.  Decrease your coffee consumption.  Take medications as prescribed as needed for symptoms.  Follow-up with your primary care physician within 1 to 2 weeks.  Return to the ER for any worsening or worrisome symptoms.

## 2025-08-14 ENCOUNTER — HOSPITAL ENCOUNTER (EMERGENCY)
Age: 75
Discharge: HOME | End: 2025-08-14
Payer: MEDICARE

## 2025-08-14 VITALS
SYSTOLIC BLOOD PRESSURE: 157 MMHG | BODY MASS INDEX: 28.28 KG/M2 | HEIGHT: 66 IN | HEART RATE: 69 BPM | OXYGEN SATURATION: 100 % | RESPIRATION RATE: 15 BRPM | WEIGHT: 176 LBS | DIASTOLIC BLOOD PRESSURE: 79 MMHG | TEMPERATURE: 98.4 F

## 2025-08-14 DIAGNOSIS — R53.83 OTHER FATIGUE: Primary | ICD-10-CM

## 2025-08-14 LAB
ALBUMIN SERPL-MCNC: 3.6 G/DL (ref 3.2–4.6)
ALBUMIN/GLOB SERPL: 1 (ref 1–1.9)
ALP SERPL-CCNC: 88 U/L (ref 35–104)
ALT SERPL-CCNC: 36 U/L (ref 8–45)
ANION GAP SERPL CALC-SCNC: 11 MMOL/L (ref 7–16)
APPEARANCE UR: CLEAR
AST SERPL-CCNC: 33 U/L (ref 15–37)
BACTERIA URNS QL MICRO: NEGATIVE /HPF
BASOPHILS # BLD: 0.08 K/UL (ref 0–0.2)
BASOPHILS NFR BLD: 1.1 % (ref 0–2)
BILIRUB SERPL-MCNC: 0.4 MG/DL (ref 0–1.2)
BILIRUB UR QL: NEGATIVE
BUN SERPL-MCNC: 9 MG/DL (ref 8–23)
CALCIUM SERPL-MCNC: 10 MG/DL (ref 8.8–10.2)
CASTS URNS QL MICRO: 0 /LPF
CHLORIDE SERPL-SCNC: 105 MMOL/L (ref 98–107)
CO2 SERPL-SCNC: 25 MMOL/L (ref 20–29)
COLOR UR: ABNORMAL
CREAT SERPL-MCNC: 0.85 MG/DL (ref 0.6–1.1)
CRYSTALS URNS QL MICRO: 0 /LPF
DIFFERENTIAL METHOD BLD: ABNORMAL
EOSINOPHIL # BLD: 0.13 K/UL (ref 0–0.8)
EOSINOPHIL NFR BLD: 1.8 % (ref 0.5–7.8)
EPI CELLS #/AREA URNS HPF: ABNORMAL /HPF
ERYTHROCYTE [DISTWIDTH] IN BLOOD BY AUTOMATED COUNT: 13.2 % (ref 11.9–14.6)
FLUAV RNA SPEC QL NAA+PROBE: NOT DETECTED
FLUBV RNA SPEC QL NAA+PROBE: NOT DETECTED
GLOBULIN SER CALC-MCNC: 3.8 G/DL (ref 2.3–3.5)
GLUCOSE SERPL-MCNC: 96 MG/DL (ref 70–99)
GLUCOSE UR STRIP.AUTO-MCNC: NEGATIVE MG/DL
HCT VFR BLD AUTO: 42.4 % (ref 35.8–46.3)
HGB BLD-MCNC: 14.4 G/DL (ref 11.7–15.4)
HGB UR QL STRIP: NEGATIVE
HYALINE CASTS URNS QL MICRO: ABNORMAL /LPF
IMM GRANULOCYTES # BLD AUTO: 0.02 K/UL (ref 0–0.5)
IMM GRANULOCYTES NFR BLD AUTO: 0.3 % (ref 0–5)
KETONES UR QL STRIP.AUTO: NEGATIVE MG/DL
LEUKOCYTE ESTERASE UR QL STRIP.AUTO: ABNORMAL
LYMPHOCYTES # BLD: 3.35 K/UL (ref 0.5–4.6)
LYMPHOCYTES NFR BLD: 46.1 % (ref 13–44)
MCH RBC QN AUTO: 29.1 PG (ref 26.1–32.9)
MCHC RBC AUTO-ENTMCNC: 34 G/DL (ref 31.4–35)
MCV RBC AUTO: 85.8 FL (ref 82–102)
MONOCYTES # BLD: 0.66 K/UL (ref 0.1–1.3)
MONOCYTES NFR BLD: 9.1 % (ref 4–12)
MUCOUS THREADS URNS QL MICRO: 0 /LPF
NEUTS SEG # BLD: 3.03 K/UL (ref 1.7–8.2)
NEUTS SEG NFR BLD: 41.6 % (ref 43–78)
NITRITE UR QL STRIP.AUTO: NEGATIVE
NRBC # BLD: 0 K/UL (ref 0–0.2)
PH UR STRIP: 6.5 (ref 5–9)
PLATELET # BLD AUTO: 258 K/UL (ref 150–450)
PMV BLD AUTO: 9.5 FL (ref 9.4–12.3)
POTASSIUM SERPL-SCNC: 4 MMOL/L (ref 3.5–5.1)
PROT SERPL-MCNC: 7.4 G/DL (ref 6.3–8.2)
PROT UR STRIP-MCNC: NEGATIVE MG/DL
RBC # BLD AUTO: 4.94 M/UL (ref 4.05–5.2)
RBC #/AREA URNS HPF: ABNORMAL /HPF
SARS-COV-2 RDRP RESP QL NAA+PROBE: NOT DETECTED
SODIUM SERPL-SCNC: 141 MMOL/L (ref 136–145)
SOURCE: NORMAL
SP GR UR REFRACTOMETRY: 1.01 (ref 1–1.02)
URINE CULTURE IF INDICATED: ABNORMAL
UROBILINOGEN UR QL STRIP.AUTO: 1 EU/DL (ref 0.2–1)
WBC # BLD AUTO: 7.3 K/UL (ref 4.3–11.1)
WBC URNS QL MICRO: ABNORMAL /HPF

## 2025-08-14 PROCEDURE — 99283 EMERGENCY DEPT VISIT LOW MDM: CPT

## 2025-08-14 PROCEDURE — 80053 COMPREHEN METABOLIC PANEL: CPT

## 2025-08-14 PROCEDURE — 87636 SARSCOV2 & INF A&B AMP PRB: CPT

## 2025-08-14 PROCEDURE — 81001 URINALYSIS AUTO W/SCOPE: CPT

## 2025-08-14 PROCEDURE — 85025 COMPLETE CBC W/AUTO DIFF WBC: CPT

## 2025-08-14 ASSESSMENT — PAIN - FUNCTIONAL ASSESSMENT: PAIN_FUNCTIONAL_ASSESSMENT: 0-10
